# Patient Record
Sex: FEMALE | Race: WHITE | NOT HISPANIC OR LATINO | Employment: STUDENT | URBAN - METROPOLITAN AREA
[De-identification: names, ages, dates, MRNs, and addresses within clinical notes are randomized per-mention and may not be internally consistent; named-entity substitution may affect disease eponyms.]

---
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
## 2019-01-09 ENCOUNTER — OFFICE VISIT (OUTPATIENT)
Dept: OBGYN CLINIC | Age: 19
End: 2019-01-09
Payer: COMMERCIAL

## 2019-01-09 VITALS
RESPIRATION RATE: 18 BRPM | DIASTOLIC BLOOD PRESSURE: 70 MMHG | SYSTOLIC BLOOD PRESSURE: 102 MMHG | HEART RATE: 90 BPM | WEIGHT: 127.13 LBS | BODY MASS INDEX: 19.33 KG/M2

## 2019-01-09 DIAGNOSIS — N39.0 RECURRENT UTI: Primary | ICD-10-CM

## 2019-01-09 PROCEDURE — 99212 OFFICE O/P EST SF 10 MIN: CPT | Performed by: OBSTETRICS & GYNECOLOGY

## 2019-01-09 NOTE — ASSESSMENT & PLAN NOTE
Here to review/verify instructions for antibiotic use for prevention of UTI  At this time current recommendation is macrobid 50mg with sexual activity    Reassured that this is a safe option    Reviewed pre and post IC void, general vulvar hygiene, good bladder practices, overall good health- hydration, healthy diet    Safe and effective use of medication discussed

## 2019-01-09 NOTE — PROGRESS NOTES
Assessment/Plan:    Recurrent UTI  Here to review/verify instructions for antibiotic use for prevention of UTI  At this time current recommendation is macrobid 50mg with sexual activity    Reassured that this is a safe option    Reviewed pre and post IC void, general vulvar hygiene, good bladder practices, overall good health- hydration, healthy diet    Safe and effective use of medication discussed  Diagnoses and all orders for this visit:    Recurrent UTI          Subjective:      Patient ID: Miladys Vicente is a 22 y o  female  Patient here for consult for recurring uti's  Patients mother states she saw pcp in December was told to take a antibiotic after intercourse for 6 months then to see her if symptoms dont improve then to see urologist  Mother had some questions in regards to that because she doesn't want her daughter to take that  Would like to know if there are any other options  The following portions of the patient's history were reviewed and updated as appropriate:   She  has a past medical history of Urinary tract infection  She   Patient Active Problem List    Diagnosis Date Noted    Sensation of lump in throat 12/14/2018    Pharyngitis 12/04/2018    Cough 12/04/2018    Disturbance, sleep 12/04/2018    Recurrent UTI 04/04/2018    Vaginal discomfort 04/04/2018    Dysuria-frequency syndrome 03/13/2018    Acne 11/07/2016     She  has a past surgical history that includes No past surgeries  Her family history includes Arthritis in her mother; Hypertension in her father  She  reports that she has never smoked  She has never used smokeless tobacco  She reports that she does not drink alcohol or use drugs    Current Outpatient Prescriptions   Medication Sig Dispense Refill    nitrofurantoin (MACRODANTIN) 50 mg capsule Take one capsule after intercourse 30 capsule 3    norethindrone-ethinyl estradiol (JUNEL FE 1/20) 1-20 MG-MCG per tablet Take 1 tablet by mouth daily for 92 days 92 tablet 0    benzonatate (TESSALON) 200 MG capsule       predniSONE 20 mg tablet        No current facility-administered medications for this visit  Current Outpatient Prescriptions on File Prior to Visit   Medication Sig    nitrofurantoin (MACRODANTIN) 50 mg capsule Take one capsule after intercourse    norethindrone-ethinyl estradiol (JUNEL FE 1/20) 1-20 MG-MCG per tablet Take 1 tablet by mouth daily for 92 days    benzonatate (TESSALON) 200 MG capsule     predniSONE 20 mg tablet      No current facility-administered medications on file prior to visit  She has No Known Allergies       Review of Systems      Objective:      /70 (BP Location: Right arm, Patient Position: Sitting, Cuff Size: Standard)   Pulse 90   Resp 18   Wt 57 7 kg (127 lb 2 oz)   BMI 19 33 kg/m²          Physical Exam

## 2019-01-09 NOTE — PATIENT INSTRUCTIONS

## 2019-01-30 ENCOUNTER — TELEPHONE (OUTPATIENT)
Dept: OBGYN CLINIC | Age: 19
End: 2019-01-30

## 2019-01-30 DIAGNOSIS — Z78.9 USES ORAL CONTRACEPTIVES AS PRIMARY BIRTH CONTROL METHOD: ICD-10-CM

## 2019-01-30 RX ORDER — NORETHINDRONE ACETATE AND ETHINYL ESTRADIOL 1MG-20(21)
1 KIT ORAL DAILY
Qty: 92 TABLET | Refills: 3 | Status: SHIPPED | OUTPATIENT
Start: 2019-01-30 | End: 2020-01-15 | Stop reason: SDUPTHER

## 2019-01-30 NOTE — TELEPHONE ENCOUNTER
Spoke with GRECIA and she is ok refilling her [facility] as her patient now  Please refill Junelfe 24 to [facility] please

## 2019-05-02 ENCOUNTER — OFFICE VISIT (OUTPATIENT)
Dept: FAMILY MEDICINE CLINIC | Facility: CLINIC | Age: 19
End: 2019-05-02
Payer: COMMERCIAL

## 2019-05-02 VITALS
SYSTOLIC BLOOD PRESSURE: 110 MMHG | DIASTOLIC BLOOD PRESSURE: 64 MMHG | HEART RATE: 57 BPM | BODY MASS INDEX: 19.25 KG/M2 | WEIGHT: 127 LBS | TEMPERATURE: 98.8 F | HEIGHT: 68 IN | OXYGEN SATURATION: 98 %

## 2019-05-02 DIAGNOSIS — J35.8 TONSIL STONE: Primary | ICD-10-CM

## 2019-05-02 PROCEDURE — 99213 OFFICE O/P EST LOW 20 MIN: CPT | Performed by: PHYSICIAN ASSISTANT

## 2019-06-26 ENCOUNTER — TELEPHONE (OUTPATIENT)
Dept: OBGYN CLINIC | Age: 19
End: 2019-06-26

## 2019-06-26 ENCOUNTER — APPOINTMENT (OUTPATIENT)
Dept: LAB | Facility: HOSPITAL | Age: 19
End: 2019-06-26
Attending: OBSTETRICS & GYNECOLOGY
Payer: COMMERCIAL

## 2019-06-26 DIAGNOSIS — N39.0 CHRONIC UTI: Primary | ICD-10-CM

## 2019-06-26 DIAGNOSIS — N39.0 CHRONIC UTI: ICD-10-CM

## 2019-06-26 LAB
BACTERIA UR QL AUTO: ABNORMAL /HPF
BILIRUB UR QL STRIP: ABNORMAL
CLARITY UR: ABNORMAL
COLOR UR: ABNORMAL
GLUCOSE UR STRIP-MCNC: ABNORMAL MG/DL
HGB UR QL STRIP.AUTO: NEGATIVE
KETONES UR STRIP-MCNC: ABNORMAL MG/DL
LEUKOCYTE ESTERASE UR QL STRIP: ABNORMAL
NITRITE UR QL STRIP: POSITIVE
NON-SQ EPI CELLS URNS QL MICRO: ABNORMAL /HPF
PH UR STRIP.AUTO: 6.5 [PH]
PROT UR STRIP-MCNC: >=300 MG/DL
RBC #/AREA URNS AUTO: ABNORMAL /HPF
SP GR UR STRIP.AUTO: 1.02 (ref 1–1.03)
UROBILINOGEN UR QL STRIP.AUTO: >=8 E.U./DL
WBC #/AREA URNS AUTO: ABNORMAL /HPF
WBC CLUMPS # UR AUTO: PRESENT /UL

## 2019-06-26 PROCEDURE — 81001 URINALYSIS AUTO W/SCOPE: CPT

## 2019-06-26 PROCEDURE — 87086 URINE CULTURE/COLONY COUNT: CPT

## 2019-06-26 PROCEDURE — 87186 SC STD MICRODIL/AGAR DIL: CPT

## 2019-06-26 PROCEDURE — 87077 CULTURE AEROBIC IDENTIFY: CPT

## 2019-06-26 RX ORDER — SULFAMETHOXAZOLE AND TRIMETHOPRIM 800; 160 MG/1; MG/1
TABLET ORAL
Qty: 14 TABLET | Refills: 0 | Status: CANCELLED | OUTPATIENT
Start: 2019-06-26 | End: 2019-06-29

## 2019-06-27 ENCOUNTER — TELEPHONE (OUTPATIENT)
Dept: OBGYN CLINIC | Facility: CLINIC | Age: 19
End: 2019-06-27

## 2019-06-28 ENCOUNTER — TELEPHONE (OUTPATIENT)
Dept: OBGYN CLINIC | Facility: CLINIC | Age: 19
End: 2019-06-28

## 2019-06-28 LAB — BACTERIA UR CULT: ABNORMAL

## 2019-06-28 NOTE — LETTER
07/01/19    Khanh Ruffin        We have been attempting to contact you regarding an important health issue  We have been unsuccessful in our attempts to reach you by phone  Please call our office as soon as possible at [phone] to discuss this important issue with on of our nurses  Your healthcare and well-being are our greatest concerns  Thank you for your cooperation in this matter        Sincerely,    [facility]

## 2019-06-28 NOTE — TELEPHONE ENCOUNTER
----- Message from Ollie Nguyen MD sent at 6/28/2019 12:57 PM EDT -----  Please callJulianna - her UTI is susceptible to both macrobid and bactrim  Is she feeling better?

## 2019-09-23 ENCOUNTER — TELEPHONE (OUTPATIENT)
Dept: OBGYN CLINIC | Facility: CLINIC | Age: 19
End: 2019-09-23

## 2019-09-23 DIAGNOSIS — N30.00 ACUTE CYSTITIS WITHOUT HEMATURIA: Primary | ICD-10-CM

## 2019-09-23 NOTE — TELEPHONE ENCOUNTER
Spoke with Mom    She will  Bactrim ds, # 6, bid x 3 called to [facility] on file     Pt will give ua first and then take rx

## 2019-09-23 NOTE — TELEPHONE ENCOUNTER
I reached pt for few secs - got cut off and vmailbox "not set up"  lmtcb at other  #    U/A and culture entered

## 2019-09-23 NOTE — TELEPHONE ENCOUNTER
Pt's mother is calling back - she is hoping Arlin can get a prescription for tonight  Pt can be reached at [phone]  To triage symptoms  [facility] is on file

## 2019-11-14 ENCOUNTER — OFFICE VISIT (OUTPATIENT)
Dept: OBGYN CLINIC | Facility: CLINIC | Age: 19
End: 2019-11-14
Payer: COMMERCIAL

## 2019-11-14 VITALS — DIASTOLIC BLOOD PRESSURE: 58 MMHG | SYSTOLIC BLOOD PRESSURE: 94 MMHG

## 2019-11-14 DIAGNOSIS — Z87.440 HISTORY OF FREQUENT URINARY TRACT INFECTIONS: ICD-10-CM

## 2019-11-14 DIAGNOSIS — R39.9 UTI SYMPTOMS: Primary | ICD-10-CM

## 2019-11-14 PROCEDURE — 87186 SC STD MICRODIL/AGAR DIL: CPT

## 2019-11-14 PROCEDURE — 87086 URINE CULTURE/COLONY COUNT: CPT

## 2019-11-14 PROCEDURE — 87077 CULTURE AEROBIC IDENTIFY: CPT

## 2019-11-14 PROCEDURE — 99213 OFFICE O/P EST LOW 20 MIN: CPT | Performed by: NURSE PRACTITIONER

## 2019-11-14 RX ORDER — NITROFURANTOIN 25; 75 MG/1; MG/1
100 CAPSULE ORAL 2 TIMES DAILY
Qty: 14 CAPSULE | Refills: 0 | Status: SHIPPED | OUTPATIENT
Start: 2019-11-14 | End: 2019-11-21

## 2019-11-14 RX ORDER — SULFAMETHOXAZOLE AND TRIMETHOPRIM 800; 160 MG/1; MG/1
TABLET ORAL
COMMUNITY
Start: 2019-09-23 | End: 2019-11-14 | Stop reason: ALTCHOICE

## 2019-11-14 RX ORDER — NORETHINDRONE ACETATE AND ETHINYL ESTRADIOL AND FERROUS FUMARATE 1MG-20(24)
KIT ORAL
COMMUNITY
Start: 2017-12-04 | End: 2019-11-14 | Stop reason: SDUPTHER

## 2019-11-14 NOTE — PATIENT INSTRUCTIONS
Urinary Tract Infection in Women   WHAT YOU NEED TO KNOW:   What is a urinary tract infection (UTI)? A UTI is caused by bacteria that get inside your urinary tract  Your urinary tract includes your kidneys, ureters, bladder, and urethra  Urine is made in your kidneys, and it flows from the ureters to the bladder  Urine leaves the bladder through the urethra  A UTI is more common in your lower urinary tract, which includes your bladder and urethra  What increases my risk for a UTI? · A urinary catheter or self-catheterization    · Pregnancy    · Urinary tract problems, such as a narrowing, kidney stones, or inability to empty your bladder completely    · History of a UTI    · Sexual intercourse    · Menopause    · Diabetes or obesity  What are the signs and symptoms of a UTI? · Urinating more often than usual, leaking urine, or waking from sleep to urinate    · Pain or burning when you urinate    · Pain or pressure in your lower abdomen     · Urine that smells bad    · Blood in your urine  How is a UTI diagnosed? Your healthcare provider will ask about your signs and symptoms  Your provider may press on your abdomen, sides, and back to check if you feel pain  Your urine will be tested for bacteria that may be causing your infection  If you have UTIs often, you may need more tests to find the cause  How is a UTI treated? · Antibiotics  help fight a bacterial infection  · Medicines  may be given to decrease pain and burning when you urinate  They will also help decrease the feeling that you need to urinate often  These medicines will make your urine orange or red  What can I do to prevent a UTI? · Empty your bladder often  Urinate and empty your bladder as soon as you feel the need  Do not hold your urine for long periods of time  · Wipe from front to back after you urinate or have a bowel movement    This will help prevent germs from getting into your urinary tract through your urethra  · Drink liquids as directed  Ask how much liquid to drink each day and which liquids are best for you  You may need to drink more liquids than usual to help flush out the bacteria  Do not drink alcohol, caffeine, or citrus juices  These can irritate your bladder and increase your symptoms  Your healthcare provider may recommend cranberry juice to help prevent a UTI  · Urinate after you have sex  This can help flush out bacteria passed during sex  · Do not douche or use feminine deodorants  These can change the chemical balance in your vagina  · Change sanitary pads or tampons often  This will help prevent germs from getting into your urinary tract  · Do pelvic muscle exercises often  Pelvic muscle exercises may help you start and stop urinating  Strong pelvic muscles may help you empty your bladder easier  Squeeze these muscles tightly for 5 seconds like you are trying to hold back urine  Then relax for 5 seconds  Gradually work up to squeezing for 10 seconds  Do 3 sets of 15 repetitions a day, or as directed  When should I seek immediate care? · You are urinating very little or not at all  · You have a high fever with shaking chills  · You have side or back pain that gets worse  When should I contact my healthcare provider? · You have a mild fever  · You do not feel better after 2 days of taking antibiotics  · You have new symptoms, such as blood or pus in your urine  · You are vomiting  · You have questions or concerns about your condition or care  CARE AGREEMENT:   You have the right to help plan your care  Learn about your health condition and how it may be treated  Discuss treatment options with your caregivers to decide what care you want to receive  You always have the right to refuse treatment  The above information is an  only  It is not intended as medical advice for individual conditions or treatments   Talk to your doctor, nurse or pharmacist before following any medical regimen to see if it is safe and effective for you  © 2017 [employer] Information is for End User's use only and may not be sold, redistributed or otherwise used for commercial purposes  All illustrations and images included in CareNotes® are the copyrighted property of [employer]  or Shailesh Portillo

## 2019-11-14 NOTE — PROGRESS NOTES
Diagnoses and all orders for this visit:    1  UTI symptoms  -     nitrofurantoin (MACROBID) 100 mg capsule; Take 1 capsule (100 mg total) by mouth 2 (two) times a day for 7 days  Will start empirical treatment now and will treat further based on urine culture  Can continue with AZO as well since it helps her symptoms  2  History of frequent urinary tract infections  -     Ambulatory referral to Urogynecology; Future    Other orders  -     Discontinue: sulfamethoxazole-trimethoprim (BACTRIM DS) 800-160 mg per tablet  -     Discontinue: norethindrone-ethinyl estradiol-ferrous fumarate (JUNEL FE 24) 1-20 MG-MCG(24) per tablet      All questions and concerns answered  Call with any questions  Needs annual visit for year supply of birth control  Pleasant 20 y o  female presents today with c/o UTI symptoms  She is present with her mother today  She started to have frequency, burning and dysuria that started 2 days ago  She denies flank pain, hematuria, F/C, she also started taking OTC AZO for UTI tx, so she is having red discoloration to her urine today  She has a history with frequent UTI's after intercourse and was on a maintenance dosing of macrobid, 1 tab, she was told to stop this treatment for a period of time and she did and now this is her first episode of symptoms  She has never seen a urologist for this problem in th past   Her mother states that they would like to know why this happens and should they be concerned because she is so young  Vitals:    11/14/19 1534   BP: 94/58   BP Location: Right arm   Patient Position: Sitting   Cuff Size: Standard     There is no height or weight on file to calculate BMI      No Known Allergies    Past Medical History:   Diagnosis Date    Urinary tract infection      Past Surgical History:   Procedure Laterality Date    NO PAST SURGERIES       Family History   Problem Relation Age of Onset    Hypertension Father         benign essential    Arthritis Mother     Breast cancer Neg Hx      Social History     Tobacco Use    Smoking status: Never Smoker    Smokeless tobacco: Never Used   Substance Use Topics    Alcohol use: No    Drug use: No       Current Outpatient Medications:     norethindrone-ethinyl estradiol (JUNEL FE 1/20) 1-20 MG-MCG per tablet, Take 1 tablet by mouth daily for 92 days, Disp: 92 tablet, Rfl: 3    benzonatate (TESSALON) 200 MG capsule, , Disp: , Rfl:     nitrofurantoin (MACROBID) 100 mg capsule, Take 1 capsule (100 mg total) by mouth 2 (two) times a day for 7 days, Disp: 14 capsule, Rfl: 0    nitrofurantoin (MACRODANTIN) 50 mg capsule, Take one capsule after intercourse (Patient not taking: Reported on 2019), Disp: 30 capsule, Rfl: 3    predniSONE 20 mg tablet, , Disp: , Rfl:     OB History    Para Term  AB Living   0 0 0 0 0 0   SAB TAB Ectopic Multiple Live Births   0 0 0 0 0       Review of Systems     Review of Systems   Constitutional: Negative for chills, fatigue, fever and unexpected weight change  Respiratory: Negative for shortness of breath  Gastrointestinal: Negative for anal bleeding, blood in stool, constipation and diarrhea  Genitourinary: Negative for difficulty urinating, dysuria and hematuria  OBGyn Exam     Physical Exam   Constitutional: She appears well-developed and well-nourished  No distress  Head: Normocephalic  Neck: Normal range of motion  Neck supple  Pulmonary: Effort normal  Lung sounds clear  Cardiac:  S1& S2, regular rate and rhythm  Abdominal: Soft   Non-tender, denies CVA tenderness  Pelvic exam was deferred

## 2019-11-15 DIAGNOSIS — N39.0 RECURRENT UTI: Primary | ICD-10-CM

## 2019-11-17 LAB — BACTERIA UR CULT: ABNORMAL

## 2019-11-18 ENCOUNTER — TELEPHONE (OUTPATIENT)
Dept: OBGYN CLINIC | Facility: CLINIC | Age: 19
End: 2019-11-18

## 2019-11-18 NOTE — TELEPHONE ENCOUNTER
Pt contacted # [phone]-TARUN vm- per [facility] communication consent on file- lmom advising as directed

## 2019-11-18 NOTE — TELEPHONE ENCOUNTER
----- Message from Phong Mooney sent at 11/18/2019  7:07 AM EST -----  Please let pt know that her urine culture was positive for UTI  Current ABX is ok to complete  E-coli bacteria, recommend wiping front to back  Also continue with plan to F/U with URO/GYN to see why she has this history  She can restart single ABX dose after intercourse until she sees those specialists for further management  Call if her symptoms do not improve  Thanks!

## 2020-01-15 ENCOUNTER — TELEPHONE (OUTPATIENT)
Dept: OBGYN CLINIC | Facility: CLINIC | Age: 20
End: 2020-01-15

## 2020-01-15 DIAGNOSIS — Z78.9 USES ORAL CONTRACEPTIVES AS PRIMARY BIRTH CONTROL METHOD: ICD-10-CM

## 2020-01-15 RX ORDER — NORETHINDRONE ACETATE AND ETHINYL ESTRADIOL 1MG-20(21)
1 KIT ORAL DAILY
Qty: 90 TABLET | Refills: 1 | Status: SHIPPED | OUTPATIENT
Start: 2020-01-15 | End: 2020-06-25

## 2020-01-15 NOTE — TELEPHONE ENCOUNTER
Patient needs refill to [id number]  They were also inquiring on automatice refills  She gets 90 day supply of Junel  I have scheduled her annual visit with Dr Claribel Byrd for 4/14/2020 at 2pm     She is on her last week now

## 2020-01-16 NOTE — TELEPHONE ENCOUNTER
They will not be able to get the mailorder one for 5-7 days  Can we call one pack into her local pharmacy for pickup?     [facility] in [place]    Please Advise

## 2020-02-17 ENCOUNTER — SOCIAL WORK (OUTPATIENT)
Dept: BEHAVIORAL/MENTAL HEALTH CLINIC | Facility: CLINIC | Age: 20
End: 2020-02-17
Payer: COMMERCIAL

## 2020-02-17 DIAGNOSIS — F43.23 ADJUSTMENT DISORDER WITH MIXED ANXIETY AND DEPRESSED MOOD: Primary | ICD-10-CM

## 2020-02-17 PROCEDURE — 90834 PSYTX W PT 45 MINUTES: CPT | Performed by: SOCIAL WORKER

## 2020-02-17 PROCEDURE — 1036F TOBACCO NON-USER: CPT | Performed by: SOCIAL WORKER

## 2020-02-17 NOTE — PSYCH
Start Time 3:00PM-3:50PM  Assessment/Plan: Insecurities and low self esteem  There are no diagnoses linked to this encounter  Subjective:  Mahamed Concepcion was able to share that she does not like the way that she looks  Her boyfriend recently broke up with her and she is still in love with him  Her insecurities played a big role in why they broke up  She has a good relationship with her parents and her older brother  She is not able to like the way she looks  She does not like the way her nose is and she used to have acne when she was younger  She became teary eyed when she spoke of how she looks  She thought about getting a "a nose job" but is afraid that she will not like a new nose either  She struggles with the college party scene  She worried about her boyfriend going to a college party and finding someone better looking that her  Discussion of what would have happened if he would have gotten drunk and cheated on her and she said that they would have broken up which they did anyway  Patient ID: Wan Ziegler is a 20 y o  female  HPI    Review of Joe Aburto is not currently on any medications  Objective:  Mahamed Concepcion lacks the self confidence as she does not like the way her nose looks and this was the focus of the session  She is alberto to recognize that in others looks are not everything but who you are, but self admittedly struggles applying this to herself  She is friendly and cooperative and made good eye contact  She became teary eyed when speaking of her ex  She will make a pros and cons list of a  Nose job and what her good qualities are for next session       Physical Exam  Mental Health: Denies any SI/HI or AH/VH  Mood:Dysthymic  Affect:Congruent  Appearance: Well groomed and appropriate   Speech:Coherent   Thought Process: Logical

## 2020-03-02 ENCOUNTER — SOCIAL WORK (OUTPATIENT)
Dept: BEHAVIORAL/MENTAL HEALTH CLINIC | Facility: CLINIC | Age: 20
End: 2020-03-02
Payer: COMMERCIAL

## 2020-03-02 DIAGNOSIS — F43.23 ADJUSTMENT DISORDER WITH MIXED ANXIETY AND DEPRESSED MOOD: Primary | ICD-10-CM

## 2020-03-02 PROCEDURE — 1036F TOBACCO NON-USER: CPT | Performed by: SOCIAL WORKER

## 2020-03-02 PROCEDURE — 90834 PSYTX W PT 45 MINUTES: CPT | Performed by: SOCIAL WORKER

## 2020-03-02 NOTE — PSYCH
Start Time 2:00PM-2:45PM  Assessment/Plan: Robbie Templeton is not happy with the way she looks  There are no diagnoses linked to this encounter  Subjective:  Robbie Templeton was able to share that she is getting out more as when her boyfriend broke up with her 6 weeks ago and she has found out things about him that make her grateful that they are no longer together  She stated that she has no idea what she will do in the fall  She is speaking of going to [employer] [place] as she is a communications major but she also enjoys the arts  She stated that her ex boyfriend is losing more than she is as she is motivated and he is not  She does not feel as though he has much of a future  She states that all these things are making her understand that they would have never worked anyway  She does not want to be with someone who she has to be supportive and try to motivate  She did the activity of making a pros and cons list of her getting a nose job  She stated that the cons are far more than the pros  This session was more honest and about the break up that she went through as opposed to her appearance  She stated and presented as being far more confident  Patient ID: Buddy Jose is a 20 y o  female  HPI    Review of Systems  Robbie Templeton was able to state that she has been following all treatment recommendations  Objective:  Robbie Templeton was able to be honest as evidenced by the subject matter of the session and the focus being off of her nose  She is insightful to the fact that she does not want to be with him, although spent the whole session talking about him and his lack of communication  She was initially blaming herself about her insecurities negatively affecting the relationship although she is no longer blaming herself due to his behaviors  Area of concern is her own insecurities and her ability to put pressure on him and now absolving herself of any responsibility for the relationship    She is presenting with a false sense of confidence which may be her defense mechanism       Physical Exam  Mental Health denies any SI/HI or AH/VH  Mood: Euthymic  Affect: Broad  Appearance: Appropriate, presents as stated age, well groomed  Speech: coherent  Thought Process: Logical

## 2020-03-02 NOTE — PATIENT INSTRUCTIONS
Follow up in 2 weeks Patient: Vandana Aranda    Procedure Summary     Date:  02/27/19 Room / Location:  [facility] ENDOSCOPY 01 / [facility] ENDO    Anesthesia Start:  0476 Anesthesia Stop:      Procedure:  COLONOSCOPY (N/A ) Diagnosis:       Colon cancer screening      (Barbie Sepulveda

## 2020-03-11 ENCOUNTER — SOCIAL WORK (OUTPATIENT)
Dept: BEHAVIORAL/MENTAL HEALTH CLINIC | Facility: CLINIC | Age: 20
End: 2020-03-11
Payer: COMMERCIAL

## 2020-03-11 DIAGNOSIS — F43.23 ADJUSTMENT DISORDER WITH MIXED ANXIETY AND DEPRESSED MOOD: Primary | ICD-10-CM

## 2020-03-11 PROCEDURE — 90834 PSYTX W PT 45 MINUTES: CPT | Performed by: SOCIAL WORKER

## 2020-03-11 PROCEDURE — 1036F TOBACCO NON-USER: CPT | Performed by: SOCIAL WORKER

## 2020-03-11 NOTE — PSYCH
Start Time 9:05Am-9:50 AM  Assessment/Plan:   Arlin reports that she had insomnia last night and was not able to sleep  There are no diagnoses linked to this encounter  Subjective:  Dodie Walton stated that this is spring break and she is not seeing her ex which is helpful  She spent the weekend with her cousin in [place] where her cousin goes to school  She got accepted to [employer] and will be going there in the fall  She is not concerned about the one night of insomnia  She states that her her new goals would have never been met if she was still with him  She states her family liked him but knew that she would not end being with him forever  She feels that with her communication degree she will have more opportunity in the [place] area  She is nervous about living away from home for the first time although she is looking forward to it  She states that she is excited but knows she will miss home  She is looking for off campus housing and she does not know if she will take her car  She further states that her mother will miss her as she is the last one in the home  She states that her mother still cries when her brother leaves home  She is going to see how she will handle everything before looking into a part time job  Her parents are paying for school and want her to concentrate on school  She is in acceptance about her looks since they broke up and she feels less worried about how she looks  She states that she was jealous and insecure and that is what turned him off to her and it had nothing to do with her looks  She states that she likes who she is and she feels confident  She is understanding that she is also responsible oft the relationship not working  Patient ID: Scott Hackett is a 20 y o  female  HPI    Review of Ivone Friend reports taking her medications as prescribed        Objective: Arlin is insightful to her role in the breakup although feels as though her insecurities about her looks are out of her control which is an area of concern  Her stating that she is confident appears to be a defense mechanism as she is extremely insecure  She did not want hm to hang out with his friends and this was a problem as well  She has many strengths including her intelligence but her inability to not over think things is also an area of concern  She has made progress in her ability to assume her role in her relationship demise  Physical Exam  Mental Health:  Denies any SI/HI or AH/VH  Mood:  Euthymic  Affect: Congruent  Appearance: Casual, dressed for the season and presents as stated age    Speech: Coherent  Thought Process: Logical

## 2020-03-23 ENCOUNTER — TELEPHONE (OUTPATIENT)
Dept: BEHAVIORAL/MENTAL HEALTH CLINIC | Facility: CLINIC | Age: 20
End: 2020-03-23

## 2020-06-25 DIAGNOSIS — Z78.9 USES ORAL CONTRACEPTIVES AS PRIMARY BIRTH CONTROL METHOD: ICD-10-CM

## 2020-06-25 RX ORDER — NORETHINDRONE ACETATE AND ETHINYL ESTRADIOL 1MG-20(21)
KIT ORAL
Qty: 84 TABLET | Refills: 3 | Status: SHIPPED | OUTPATIENT
Start: 2020-06-25 | End: 2020-08-12 | Stop reason: SDUPTHER

## 2020-08-12 ENCOUNTER — OFFICE VISIT (OUTPATIENT)
Dept: FAMILY MEDICINE CLINIC | Facility: CLINIC | Age: 20
End: 2020-08-12
Payer: COMMERCIAL

## 2020-08-12 ENCOUNTER — ANNUAL EXAM (OUTPATIENT)
Dept: OBGYN CLINIC | Facility: CLINIC | Age: 20
End: 2020-08-12
Payer: COMMERCIAL

## 2020-08-12 VITALS — SYSTOLIC BLOOD PRESSURE: 96 MMHG | WEIGHT: 135.6 LBS | BODY MASS INDEX: 20.62 KG/M2 | DIASTOLIC BLOOD PRESSURE: 60 MMHG

## 2020-08-12 VITALS
TEMPERATURE: 98.9 F | SYSTOLIC BLOOD PRESSURE: 120 MMHG | HEIGHT: 68 IN | DIASTOLIC BLOOD PRESSURE: 70 MMHG | HEART RATE: 99 BPM | BODY MASS INDEX: 20.46 KG/M2 | OXYGEN SATURATION: 100 % | WEIGHT: 135 LBS | RESPIRATION RATE: 14 BRPM

## 2020-08-12 DIAGNOSIS — Z78.9 USES ORAL CONTRACEPTIVES AS PRIMARY BIRTH CONTROL METHOD: ICD-10-CM

## 2020-08-12 DIAGNOSIS — N39.0 RECURRENT UTI: Primary | ICD-10-CM

## 2020-08-12 DIAGNOSIS — J02.9 PHARYNGITIS, UNSPECIFIED ETIOLOGY: Primary | ICD-10-CM

## 2020-08-12 PROCEDURE — 99395 PREV VISIT EST AGE 18-39: CPT | Performed by: STUDENT IN AN ORGANIZED HEALTH CARE EDUCATION/TRAINING PROGRAM

## 2020-08-12 PROCEDURE — 99213 OFFICE O/P EST LOW 20 MIN: CPT | Performed by: FAMILY MEDICINE

## 2020-08-12 PROCEDURE — 3008F BODY MASS INDEX DOCD: CPT | Performed by: FAMILY MEDICINE

## 2020-08-12 PROCEDURE — 1036F TOBACCO NON-USER: CPT | Performed by: FAMILY MEDICINE

## 2020-08-12 RX ORDER — NORETHINDRONE ACETATE AND ETHINYL ESTRADIOL 1MG-20(21)
1 KIT ORAL DAILY
Qty: 84 TABLET | Refills: 3 | Status: SHIPPED | OUTPATIENT
Start: 2020-08-12 | End: 2021-03-24 | Stop reason: SDUPTHER

## 2020-08-12 RX ORDER — SULFAMETHOXAZOLE AND TRIMETHOPRIM 400; 80 MG/1; MG/1
TABLET ORAL
Qty: 30 TABLET | Refills: 1 | Status: SHIPPED | OUTPATIENT
Start: 2020-08-12 | End: 2021-08-10 | Stop reason: ALTCHOICE

## 2020-08-12 NOTE — PROGRESS NOTES
Assessment/Plan:     Chronic Problems:  No problem-specific Assessment & Plan notes found for this encounter  Visit Diagnosis:  Diagnoses and all orders for this visit:    Pharyngitis, unspecified etiology     Rapid strep negative, discussed plan of care with patient's mother, recommended OTC Tylenol/motrin for discomfort, throat loss, gargle  Call for any changes      Subjective:    Patient ID: Enrique Miranda is a 20 y o  female  Here with mother  Complaining of sore throat x1 day  Negative ill contact, has recently tested negative for COVID  Negative fever chills cough rash earache sinus  Self treating with Motrin  Nonsmoker  Employed        The following portions of the patient's history were reviewed and updated as appropriate: allergies, current medications, past family history, past medical history, past social history, past surgical history and problem list     Review of Systems   Constitutional: Negative for appetite change, chills, fever and unexpected weight change  HENT: Positive for sore throat  Negative for congestion, dental problem, ear pain, hearing loss, postnasal drip, rhinorrhea, sinus pressure, sinus pain, sneezing, tinnitus and voice change  Eyes: Negative for visual disturbance  Respiratory: Negative for apnea, cough, chest tightness and shortness of breath  Cardiovascular: Negative for chest pain, palpitations and leg swelling  Gastrointestinal: Negative for abdominal pain, blood in stool, constipation, diarrhea, nausea and vomiting  Endocrine: Negative for cold intolerance, heat intolerance, polydipsia, polyphagia and polyuria  Genitourinary: Negative for decreased urine volume, difficulty urinating, dysuria, frequency and hematuria  Musculoskeletal: Negative for arthralgias, back pain, gait problem, joint swelling and myalgias  Skin: Negative for color change, rash and wound  Allergic/Immunologic: Negative for environmental allergies and food allergies  Neurological: Negative for dizziness, syncope, weakness, light-headedness, numbness and headaches  Hematological: Negative for adenopathy  Does not bruise/bleed easily  Psychiatric/Behavioral: Negative for sleep disturbance and suicidal ideas  The patient is not nervous/anxious            /70 (BP Location: Left arm, Patient Position: Sitting, Cuff Size: Adult)   Pulse 99   Temp 98 9 °F (37 2 °C)   Resp 14   Ht 5' 8" (1 727 m)   Wt 61 2 kg (135 lb)   LMP 07/27/2020 (Exact Date)   SpO2 100%   BMI 20 53 kg/m²   Social History     Socioeconomic History    Marital status: Single     Spouse name: Not on file    Number of children: Not on file    Years of education: currently in school    Highest education level: Not on file   Occupational History    Not on file   Social Needs    Financial resource strain: Not on file    Food insecurity     Worry: Not on file     Inability: Not on file    Transportation needs     Medical: Not on file     Non-medical: Not on file   Tobacco Use    Smoking status: Never Smoker    Smokeless tobacco: Never Used   Substance and Sexual Activity    Alcohol use: No    Drug use: No    Sexual activity: Yes     Birth control/protection: OCP   Lifestyle    Physical activity     Days per week: Not on file     Minutes per session: Not on file    Stress: Not on file   Relationships    Social connections     Talks on phone: Not on file     Gets together: Not on file     Attends [employer] service: Not on file     Active member of club or organization: Not on file     Attends meetings of clubs or organizations: Not on file     Relationship status: Not on file    Intimate partner violence     Fear of current or ex partner: Not on file     Emotionally abused: Not on file     Physically abused: Not on file     Forced sexual activity: Not on file   Other Topics Concern    Not on file   Social History Narrative    Lives with parents    Seeing a dentist     Past Medical History:   Diagnosis Date    Urinary tract infection      Family History   Problem Relation Age of Onset    Hypertension Father         benign essential    Arthritis Mother     Breast cancer Neg Hx      Past Surgical History:   Procedure Laterality Date    NO PAST SURGERIES         Current Outpatient Medications:     norethindrone-ethinyl estradiol (Blisovi FE 1/20) 1-20 MG-MCG per tablet, Take 1 tablet by mouth daily, Disp: 84 tablet, Rfl: 3    predniSONE 20 mg tablet, , Disp: , Rfl:     sulfamethoxazole-trimethoprim (BACTRIM) 400-80 mg per tablet, Take 1 pill following each episode of intercourse , Disp: 30 tablet, Rfl: 1    benzonatate (TESSALON) 200 MG capsule, , Disp: , Rfl:     nitrofurantoin (MACRODANTIN) 50 mg capsule, Take one capsule after intercourse (Patient not taking: Reported on 11/14/2019), Disp: 30 capsule, Rfl: 3    No Known Allergies       Lab Review   No visits with results within 2 Month(s) from this visit  Latest known visit with results is:   Orders Only on 11/15/2019   Component Date Value    Urine Culture 11/14/2019 >100,000 cfu/ml Escherichia coli*        Imaging: No results found  Objective:     Physical Exam  Constitutional:       General: She is not in acute distress  Appearance: She is well-developed  She is not ill-appearing or toxic-appearing  HENT:      Head: Normocephalic and atraumatic  Right Ear: Tympanic membrane and ear canal normal       Left Ear: Tympanic membrane and ear canal normal       Mouth/Throat:      Pharynx: Posterior oropharyngeal erythema (Mild) present  Eyes:      Conjunctiva/sclera: Conjunctivae normal    Neck:      Musculoskeletal: Normal range of motion and neck supple  Cardiovascular:      Rate and Rhythm: Normal rate and regular rhythm  Heart sounds: Normal heart sounds  Pulmonary:      Effort: Pulmonary effort is normal       Breath sounds: Normal breath sounds  Musculoskeletal: Normal range of motion  Lymphadenopathy:      Cervical: Cervical adenopathy ( left anterior cervical tender) present  Skin:     General: Skin is warm and dry  Findings: No rash  Neurological:      Mental Status: She is alert and oriented to person, place, and time  Deep Tendon Reflexes: Reflexes are normal and symmetric  Psychiatric:         Behavior: Behavior normal          Thought Content: Thought content normal          Judgment: Judgment normal            There are no Patient Instructions on file for this visit  MOSHE Pedraza    Portions of the record may have been created with voice recognition software  Occasional wrong word or "sound a like" substitutions may have occurred due to the inherent limitations of voice recognition software  Read the chart carefully and recognize, using context, where substitutions have occurred

## 2020-08-12 NOTE — PROGRESS NOTES
Assessment/Plan:     19 yo G0 with recurrent UTIs - will Rx post-coital bactrim  Discussed hydrating, hygiene, etc   Will refill Junel  F/u in 1 yr for pap smear  Problem List Items Addressed This Visit     Genitourinary    Recurrent UTI - Primary    sulfamethoxazole-trimethoprim (BACTRIM) 400-80 mg per tablet      Other Visit Diagnoses     Uses oral contraceptives as primary birth control method        norethindrone-ethinyl estradiol (Blisovi FE 1/20) 1-20 MG-MCG per tablet            Subjective:     Patient ID: Martha Traylor is a 20 y o  female  This is a 20 y o  G0 with last menstrual period was 07/27/2020 (exact date) currently using Donmargarito Valarie for contraception  She has regular periods of normal amount and duration  She is sexually active and declines STD testing today  Reports she and her partner use condoms  She has no issues with intercourse  Tod Puentes does report a history of recurrent UTIs  There are 3 documented UTIs in our system over the past year - all E  Coli  She has tried post-coital macrobid but doesn't believe this is very helpful  She also take azo, cranberry tablets, and hydrates  Screening:  Last pap smear: not yet 20          Review of Systems   Constitutional: Negative  HENT: Negative  Eyes: Negative  Respiratory: Negative  Cardiovascular: Negative  Gastrointestinal: Negative  Endocrine: Negative  Genitourinary: Negative for dyspareunia, dysuria, frequency, menstrual problem, pelvic pain, vaginal discharge and vaginal pain  Musculoskeletal: Negative  Skin: Negative  Allergic/Immunologic: Negative  Neurological: Negative  Hematological: Negative  Psychiatric/Behavioral: Negative  Objective:     Physical Exam  Pulmonary:      Effort: Pulmonary effort is normal    Skin:     General: Skin is warm     Psychiatric:         Mood and Affect: Mood normal

## 2021-03-24 DIAGNOSIS — Z78.9 USES ORAL CONTRACEPTIVES AS PRIMARY BIRTH CONTROL METHOD: ICD-10-CM

## 2021-03-24 RX ORDER — NORETHINDRONE ACETATE AND ETHINYL ESTRADIOL 1MG-20(21)
1 KIT ORAL DAILY
Qty: 84 TABLET | Refills: 1 | Status: SHIPPED | OUTPATIENT
Start: 2021-03-24 | End: 2021-11-30

## 2021-08-10 ENCOUNTER — TELEPHONE (OUTPATIENT)
Dept: OBGYN CLINIC | Facility: CLINIC | Age: 21
End: 2021-08-10

## 2021-08-10 DIAGNOSIS — N39.0 RECURRENT UTI: Primary | ICD-10-CM

## 2021-08-10 RX ORDER — SULFAMETHOXAZOLE AND TRIMETHOPRIM 800; 160 MG/1; MG/1
TABLET ORAL
Qty: 14 TABLET | Refills: 0 | Status: SHIPPED | OUTPATIENT
Start: 2021-08-10 | End: 2021-08-13

## 2021-08-10 NOTE — TELEPHONE ENCOUNTER
Please send bactrim DS x 7 but if nausea/vomiting fever, there is concern for pyelonephritis  If she is able a urine and cbc would be very helpful for her treatment       IF her symptoms worsen - pain increases, fever not resolving with tylenol/ibuprofen, dehydration etc needs to go to ER

## 2021-08-10 NOTE — TELEPHONE ENCOUNTER
Patients mom is calling on behalf of patient stating she woke up with a UTI  She is experiencing burning with urination and frequency  Her mom said her daughter is very sick and cannot go to the lab for urine studies (unsure if its food poison or something else)

## 2021-11-30 DIAGNOSIS — Z78.9 USES ORAL CONTRACEPTIVES AS PRIMARY BIRTH CONTROL METHOD: ICD-10-CM

## 2021-11-30 RX ORDER — NORETHINDRONE ACETATE AND ETHINYL ESTRADIOL 1MG-20(21)
KIT ORAL
Qty: 84 TABLET | Refills: 3 | Status: SHIPPED | OUTPATIENT
Start: 2021-11-30

## 2022-03-21 ENCOUNTER — APPOINTMENT (OUTPATIENT)
Dept: LAB | Facility: HOSPITAL | Age: 22
End: 2022-03-21
Payer: COMMERCIAL

## 2022-03-21 DIAGNOSIS — J30.89 ALLERGIC RHINITIS DUE TO OTHER ALLERGIC TRIGGER, UNSPECIFIED SEASONALITY: ICD-10-CM

## 2022-03-21 LAB
BASOPHILS # BLD AUTO: 0.04 THOUSANDS/ΜL (ref 0–0.1)
BASOPHILS NFR BLD AUTO: 1 % (ref 0–1)
EOSINOPHIL # BLD AUTO: 0.33 THOUSAND/ΜL (ref 0–0.61)
EOSINOPHIL NFR BLD AUTO: 7 % (ref 0–6)
ERYTHROCYTE [DISTWIDTH] IN BLOOD BY AUTOMATED COUNT: 11.9 % (ref 11.6–15.1)
HCT VFR BLD AUTO: 40.1 % (ref 34.8–46.1)
HGB BLD-MCNC: 14.1 G/DL (ref 11.5–15.4)
IMM GRANULOCYTES # BLD AUTO: 0.01 THOUSAND/UL (ref 0–0.2)
IMM GRANULOCYTES NFR BLD AUTO: 0 % (ref 0–2)
LYMPHOCYTES # BLD AUTO: 1.6 THOUSANDS/ΜL (ref 0.6–4.47)
LYMPHOCYTES NFR BLD AUTO: 33 % (ref 14–44)
MCH RBC QN AUTO: 30.3 PG (ref 26.8–34.3)
MCHC RBC AUTO-ENTMCNC: 35.2 G/DL (ref 31.4–37.4)
MCV RBC AUTO: 86 FL (ref 82–98)
MONOCYTES # BLD AUTO: 0.38 THOUSAND/ΜL (ref 0.17–1.22)
MONOCYTES NFR BLD AUTO: 8 % (ref 4–12)
NEUTROPHILS # BLD AUTO: 2.46 THOUSANDS/ΜL (ref 1.85–7.62)
NEUTS SEG NFR BLD AUTO: 51 % (ref 43–75)
NRBC BLD AUTO-RTO: 0 /100 WBCS
PLATELET # BLD AUTO: 268 THOUSANDS/UL (ref 149–390)
PMV BLD AUTO: 8.8 FL (ref 8.9–12.7)
RBC # BLD AUTO: 4.65 MILLION/UL (ref 3.81–5.12)
WBC # BLD AUTO: 4.82 THOUSAND/UL (ref 4.31–10.16)

## 2022-03-21 PROCEDURE — 36415 COLL VENOUS BLD VENIPUNCTURE: CPT

## 2022-03-21 PROCEDURE — 86003 ALLG SPEC IGE CRUDE XTRC EA: CPT

## 2022-03-21 PROCEDURE — 85025 COMPLETE CBC W/AUTO DIFF WBC: CPT

## 2022-03-23 LAB
D FARINAE IGE QN: <0.1 KUA/I
D PTERONYSS IGE QN: <0.1 KUA/I

## 2022-09-13 PROBLEM — J30.9 ALLERGIC RHINITIS: Status: ACTIVE | Noted: 2022-09-13

## 2022-09-13 PROBLEM — H10.13 ALLERGIC CONJUNCTIVITIS OF BOTH EYES: Status: ACTIVE | Noted: 2022-09-13

## 2022-11-04 ENCOUNTER — ANNUAL EXAM (OUTPATIENT)
Dept: OBGYN CLINIC | Facility: CLINIC | Age: 22
End: 2022-11-04

## 2022-11-04 VITALS
DIASTOLIC BLOOD PRESSURE: 84 MMHG | SYSTOLIC BLOOD PRESSURE: 110 MMHG | WEIGHT: 149.8 LBS | BODY MASS INDEX: 22.7 KG/M2 | HEIGHT: 68 IN

## 2022-11-04 DIAGNOSIS — Z12.4 SCREENING FOR CERVICAL CANCER: ICD-10-CM

## 2022-11-04 DIAGNOSIS — Z78.9 USES ORAL CONTRACEPTIVES AS PRIMARY BIRTH CONTROL METHOD: ICD-10-CM

## 2022-11-04 DIAGNOSIS — Z01.419 ENCOUNTER FOR ANNUAL ROUTINE GYNECOLOGICAL EXAMINATION: Primary | ICD-10-CM

## 2022-11-04 DIAGNOSIS — Z11.3 SCREENING FOR STDS (SEXUALLY TRANSMITTED DISEASES): ICD-10-CM

## 2022-11-04 RX ORDER — NORETHINDRONE ACETATE AND ETHINYL ESTRADIOL 1MG-20(21)
1 KIT ORAL DAILY
Qty: 84 TABLET | Refills: 3 | Status: SHIPPED | OUTPATIENT
Start: 2022-11-04

## 2022-11-04 NOTE — PROGRESS NOTES
Patient is here today for a gynecological annual exam    No questions or concerns today    LMP: 10/19/2022   Menarche age: 14    BC: OCP    Last pap: Not on file    Gardasil: Unsure if she had

## 2022-11-04 NOTE — PROGRESS NOTES
Yasemin Rubio  2000    Assessment and Plan:  Yearly exam without abnormality  Encounter for annual routine gynecological examination  ASCCP guidelines reviewed- first pap done today  Safe sex practices and condom use encouraged  SBE, daily exercise and healthy diet with adequate calcium and vitamin D encouraged  Weight bearing exercises a minimum of 150 minutes/week advised  Gardisil vaccines recommended- information provided  Advised to call with any issues, all concerns & questions addressed  See provided information in your after visit summary     Uses oral contraceptives as primary birth control method  Happy with her current BCM  Discussed ACHES  Diagnoses and all orders for this visit:    Encounter for annual routine gynecological examination    Screening for cervical cancer  -     Liquid-based pap, screening    Screening for STDs (sexually transmitted diseases)  -     Chlamydia/GC amplified DNA by PCR    Uses oral contraceptives as primary birth control method  -     norethindrone-ethinyl estradiol (Blisovi FE ) 1-20 MG-MCG per tablet; Take 1 tablet by mouth daily      F/U Annually and PRN    Results will be released to [facility], if abnormal will call or message to review and discuss treatment plan  Health Maintenance:    First pap done today  Gardasil Vaccine Series: She has not had the Gardasil series  Gardasil 9 was advised for prevention of HPV-related disease  We discussed risks/benefits, SE's/AE's at length and all questions were answered  Written info was provided for review  The patient agrees to consider and is aware she may call to schedule injection #1 at any time  Subjective    CC: Yearly Exam     Yasemin Rubio is a 20 y o  female  here for an annual exam   She has no concerns today  Menarche: 14    Patient's last menstrual period was 10/19/2022 (exact date)  Contraception: combination OCPs     Non-smoker, social drinker  Exercise: sports, does snowboarding  Her menstrual cycles are regular every 28-30 days  She denies any issues with bleeding or her menses  Sexual activity: She is sexually active without pain, bleeding or dryness  STD testing:  She does want STD testing today  She denies breast concerns, abnormal vaginal discharge, vaginal itching, odor, irritation, bowel/bladder dysfunction, urinary symptoms, pelvic pain, or dyspareunia today  She graduated from [address] [place] [address] in [address] [facility]  She is hoping to get a job as a        Family hx of breast cancer: No  Family hx of ovarian cancer: No  Family hx of colon cancer: No    Past Medical History:   Diagnosis Date   • Urinary tract infection      Past Surgical History:   Procedure Laterality Date   • NO PAST SURGERIES         Immunization History   Administered Date(s) Administered   • DTaP 5 2000, 01/24/2000, 04/17/2000, 06/21/2001, 03/14/2005   • Hepatitis A, Pediatric 05/03/2000, 07/24/2000, 03/07/2001   • IPV 1999, 02/29/2000, 06/12/2001, 03/14/2005   • MMR 04/23/2001, 05/02/2005   • Meningococcal, Unknown Serogroups 11/11/2010, 08/31/2017   • Tdap 11/11/2010   • Varicella 11/03/2000, 11/13/2008       Family History   Problem Relation Age of Onset   • Arthritis Mother    • Hypertension Father         benign essential   • Breast cancer Neg Hx    • Colon cancer Neg Hx    • Ovarian cancer Neg Hx    • Uterine cancer Neg Hx    • Cervical cancer Neg Hx      Social History     Tobacco Use   • Smoking status: Never Smoker   • Smokeless tobacco: Never Used   Vaping Use   • Vaping Use: Never used   Substance Use Topics   • Alcohol use: No     Comment: socially   • Drug use: No       Current Outpatient Medications:   •  cetirizine (ZyrTEC) 10 mg tablet, Take 10 mg by mouth daily, Disp: , Rfl:   •  fluticasone (FLONASE) 50 mcg/act nasal spray, 1 spray into each nostril 2 (two) times a day, Disp: , Rfl:   •  norethindrone-ethinyl estradiol (Blisovi FE ) 1-20 MG-MCG per tablet, Take 1 tablet by mouth daily, Disp: 84 tablet, Rfl: 3  Patient Active Problem List    Diagnosis Date Noted   • Encounter for annual routine gynecological examination 2022   • Uses oral contraceptives as primary birth control method 2022   • Allergic rhinitis 2022   • Allergic conjunctivitis of both eyes 2022   • UTI symptoms 2019   • History of frequent urinary tract infections 2019   • Sensation of lump in throat 2018   • Pharyngitis 2018   • Cough 2018   • Disturbance, sleep 2018   • Recurrent UTI 2018   • Vaginal discomfort 2018   • Dysuria-frequency syndrome 2018   • Acne 2016       No Known Allergies    OB History    Para Term  AB Living   0 0 0 0 0 0   SAB IAB Ectopic Multiple Live Births   0 0 0 0 0       Vitals:    22 0938   BP: 110/84   BP Location: Right arm   Patient Position: Sitting   Cuff Size: Standard   Weight: 67 9 kg (149 lb 12 8 oz)   Height: 5' 8" (1 727 m)     Body mass index is 22 78 kg/m²  Review of Systems   Constitutional: Negative for chills, fatigue, fever and unexpected weight change  Respiratory: Negative for shortness of breath  Cardiovascular: Negative for chest pain  Gastrointestinal: Negative for abdominal pain, constipation, diarrhea, nausea and vomiting  Endocrine: Negative  Genitourinary: Negative for difficulty urinating, dyspareunia, dysuria, frequency, genital sores, hematuria, menstrual problem, pelvic pain, urgency, vaginal bleeding, vaginal discharge and vaginal pain  Musculoskeletal: Negative for back pain and myalgias  Skin: Negative for pallor and rash  Neurological: Negative for headaches  Psychiatric/Behavioral: Negative for dysphoric mood  All other systems reviewed and are negative  Physical Exam  Constitutional:       General: She is not in acute distress       Appearance: Normal appearance  She is not ill-appearing  Genitourinary:      Bladder and urethral meatus normal       No lesions in the vagina  Right Labia: No rash, tenderness, lesions, skin changes or Bartholin's cyst      Left Labia: No tenderness, lesions, skin changes, Bartholin's cyst or rash  No inguinal adenopathy present in the right or left side  No vaginal discharge, erythema, tenderness, bleeding or ulceration  Right Adnexa: not tender, not full and no mass present  Left Adnexa: not tender, not full and no mass present  Cervix is nulliparous  No cervical motion tenderness, discharge, friability, lesion, polyp or nabothian cyst       Uterus is not enlarged, fixed or tender  No uterine mass detected  No urethral prolapse, tenderness or discharge present  Bladder is not tender and urgency on palpation not present  Pelvic exam was performed with patient in the lithotomy position  Breasts:      Right: No swelling, inverted nipple, mass, nipple discharge, skin change, tenderness, axillary adenopathy or supraclavicular adenopathy  Left: No swelling, inverted nipple, mass, nipple discharge, skin change, tenderness, axillary adenopathy or supraclavicular adenopathy  HENT:      Head: Normocephalic and atraumatic  Eyes:      Conjunctiva/sclera: Conjunctivae normal    Pulmonary:      Effort: Pulmonary effort is normal    Abdominal:      General: There is no distension  Palpations: Abdomen is soft  Tenderness: There is no abdominal tenderness  Musculoskeletal:         General: Normal range of motion  Cervical back: Neck supple  Lymphadenopathy:      Upper Body:      Right upper body: No supraclavicular or axillary adenopathy  Left upper body: No supraclavicular or axillary adenopathy  Lower Body: No right inguinal adenopathy  No left inguinal adenopathy     Neurological:      Mental Status: She is alert and oriented to person, place, and time    Skin:     General: Skin is warm and dry  Psychiatric:         Mood and Affect: Mood normal          Behavior: Behavior normal          Thought Content: Thought content normal          Judgment: Judgment normal    Vitals and nursing note reviewed

## 2022-11-04 NOTE — ASSESSMENT & PLAN NOTE
ASCCP guidelines reviewed- first pap done today  Safe sex practices and condom use encouraged  SBE, daily exercise and healthy diet with adequate calcium and vitamin D encouraged  Weight bearing exercises a minimum of 150 minutes/week advised  Gardisil vaccines recommended- information provided  Advised to call with any issues, all concerns & questions addressed     See provided information in your after visit summary

## 2022-11-04 NOTE — PATIENT INSTRUCTIONS
Birth Control Pills   WHAT YOU NEED TO KNOW:   What are birth control pills? Birth control pills are also called oral contraceptives, or the pill  It is medicine that helps prevent pregnancy by stopping ovulation  Ovulation is when the ovaries make and release an egg cell each month  If this egg gets fertilized by sperm, pregnancy occurs  You will need to take the pill at the same time every day  Your healthcare provider will tell you when to start taking the pill  You will also be told what to do if you miss a dose  Instructions will depend on the kind of birth control pills you are taking  What are the different kinds of birth control pills? Some kinds are taken for 21 days in a row, followed by 7 days of placebo (no hormones) pills  Other kinds are taken for 24 days followed by 4 days of placebos  Each kind has a certain amount of female hormones  Your provider will decide on the kind that is best for you based on your age and other health conditions  What may be done before I can start taking birth control pills? You need to see your healthcare provider to get a prescription  Any of the following may be done before your healthcare provider gives you a prescription:  Your healthcare provider will ask about diseases and illnesses you have had in the past  Your provider will check your risk for blood clots, heart conditions, or stroke  Tell your provider if you had gastric bypass surgery  This surgery can affect the way your body absorbs medicines such as birth control pills  Your provider will also check your blood pressure, and may do a breast and pelvic exam  A Pap smear may also be done during the pelvic exam  This is a test to make sure you do not have abnormal changes on your cervix  You may need other tests, such as a urine test to make sure you are not pregnant  Your provider will ask if you take any medicines and if you smoke   Smoking increases your risk for stroke, heart attack, or a blood clot in your lungs  If you smoke, you should not take certain kinds of birth control pills  What are the advantages of birth control pills? When birth control pills are used correctly, the chances of getting pregnant are very low  Birth control pills may help decrease bleeding and pain during your monthly period  They may also help prevent cancer of the uterus and ovaries  What are the disadvantages of birth control pills? You may have sudden changes in your mood or feelings while you take birth control pills  You may have nausea and a decreased sex drive  You may have an increased appetite and rapid weight gain  You may also have bleeding in between periods, less frequent periods, vaginal dryness, and breast pain  Birth control pills will not protect you from sexually transmitted infections  Rarely, some birth control pills can increase your risk for a blood clot  This may become life-threatening  What should I do if I decide I want to get pregnant? If you are planning to have a baby, ask your healthcare provider when you may stop taking your birth control pills  It may take some time for you to start ovulating again  Ask your healthcare provider for more information about pregnancy after birth control pills  When should I start taking birth control pills after I have a baby? If you are not breastfeeding, you may start taking birth control pills 3 weeks after you give birth  You may be able to take certain types of birth control pills if you are breastfeeding  These pills can be started from 6 weeks to 6 months after you give birth  Ask your healthcare provider for more information about when to start taking birth control pills after you give birth  What do I need to know about birth control pills and menopause? Talk with your healthcare provider if you want to take birth control pills around menopause  Around age 39, you will enter into perimenopause   This means your hormone levels are dropping and you are ovulating less often  You can still become pregnant during this time  The risk for problems, such as miscarriage, are higher if you become pregnant after age 39  Birth control pills will prevent pregnancy, and may also help prevent or relieve some signs and symptoms of menopause  Examples are hot flashes and mood swings  Your provider will do tests when you are around age 48  The tests may show that you are in menopause  If the tests do not show menopause for sure, you may be able to continue taking the pill up to age 54  The decision will depend on your health and if you have any medical conditions, such as a blood clot  Call your local emergency number (911 in the [address]) for any of the following: You have any of the following signs of a stroke:      Numbness or drooping on one side of your face     Weakness in an arm or leg    Confusion or difficulty speaking    Dizziness, a severe headache, or vision loss    You feel lightheaded, short of breath, and have chest pain  You cough up blood  When should I seek immediate care? Your arm or leg feels warm, tender, and painful  It may look swollen and red  You have severe pain, numbness, or swelling in your arms or legs  When should I call my doctor? You have forgotten to take a birth control pill  You have mood changes, such as depression, since starting birth control pills  You have nausea or are vomiting  You have severe abdominal pain  You missed a period and have questions or concerns about being pregnant  You still have bleeding 4 months after taking birth control pills correctly  You have questions or concerns about your condition or care  CARE AGREEMENT:   You have the right to help plan your care  Learn about your health condition and how it may be treated  Discuss treatment options with your healthcare providers to decide what care you want to receive  You always have the right to refuse treatment   The above information is an  only  It is not intended as medical advice for individual conditions or treatments  Talk to your doctor, nurse or pharmacist before following any medical regimen to see if it is safe and effective for you  © Copyright "[employer] 2022 Information is for End User's use only and may not be sold, redistributed or otherwise used for commercial purposes  All illustrations and images included in CareNotes® are the copyrighted property of A D A M [employer] Inc  or Eamon Pike  HPV (Human Papillomavirus)   AMBULATORY CARE:   Human Papillomavirus (HPV)  is the most common infection spread by sexual contact  It can also be spread from a mother to her baby during delivery  HPV may cause oral and genital warts or tumors in your nose, mouth, throat, and lungs  HPV may also cause vaginal, penile, and anal cancers  You may not show symptoms of any of these conditions for several years after being exposed to HPV  Common symptoms include the following:   Painless warts    Genital or anal discharge, bleeding, itching, or pain    Pain when you urinate    Call your doctor if:   You have warts in your genital or anal area  You have genital or anal discharge, bleeding, itching, or pain  You have pain when you urinate  You have questions or concerns about your condition or care  HPV diagnosis:  Your healthcare provider may use a vinegar liquid to help diagnose HPV genital warts  Women 27to 72years old can be checked for HPV during regular cervical cancer screenings  An HPV test checks for certain types of HPV that can cause changes in cervical cells  Without treatment, the changed cells can become cancer  An HPV test can be done every 5 years if the results show no infection  The test can be done with or without a Pap smear  A Pap smear checks for cancer or for abnormal cells that can become cancer   You may be tested for HPV if you are diagnosed with a mouth or throat cancer  Treatment:  HPV cannot be cured  Conditions caused by HPV can be treated  You will need to be monitored closely for these conditions  Ask your healthcare provider for more information about monitoring, conditions caused by HPV, and available treatments  Prevent HPV infection:   Ask about the HPV vaccine  The vaccine can help protect against HPV infection  Females and males can receive the vaccine  It is most effective if given before sexual activity begins  This allows the body to build almost complete protection against HPV before contact with the virus  The vaccine is usually given at 6or 15years of age but may be given as early as 5 years  The vaccine can be given through age 39  Always use a condom during intercourse  A condom will not completely protect you from HPV infection, but it will help lower your risk  Use a new condom or latex barrier each time you have sex  This includes oral, vaginal, and anal sex  Make sure the condom fits and is put on correctly  Rubber latex sheets or dental dams can be used for oral sex  If you are allergic to latex, use a nonlatex product such as polyurethane  Follow up with your healthcare provider as directed:  Write down your questions so you remember to ask them during your visits  © Copyright [employer] 2022 Information is for End User's use only and may not be sold, redistributed or otherwise used for commercial purposes  All illustrations and images included in CareNotes® are the copyrighted property of [employer]  or Eamon Pike  The above information is an  only  It is not intended as medical advice for individual conditions or treatments  Talk to your doctor, nurse or pharmacist before following any medical regimen to see if it is safe and effective for you

## 2022-11-07 LAB
C TRACH DNA SPEC QL NAA+PROBE: NEGATIVE
N GONORRHOEA DNA SPEC QL NAA+PROBE: NEGATIVE

## 2022-11-11 ENCOUNTER — OFFICE VISIT (OUTPATIENT)
Dept: FAMILY MEDICINE CLINIC | Facility: CLINIC | Age: 22
End: 2022-11-11

## 2022-11-11 ENCOUNTER — APPOINTMENT (OUTPATIENT)
Dept: LAB | Facility: HOSPITAL | Age: 22
End: 2022-11-11

## 2022-11-11 VITALS
OXYGEN SATURATION: 98 % | SYSTOLIC BLOOD PRESSURE: 108 MMHG | TEMPERATURE: 97.4 F | BODY MASS INDEX: 22.43 KG/M2 | HEIGHT: 68 IN | WEIGHT: 148 LBS | DIASTOLIC BLOOD PRESSURE: 74 MMHG | HEART RATE: 75 BPM

## 2022-11-11 DIAGNOSIS — R53.83 FATIGUE, UNSPECIFIED TYPE: ICD-10-CM

## 2022-11-11 DIAGNOSIS — Z00.00 ANNUAL PHYSICAL EXAM: Primary | ICD-10-CM

## 2022-11-11 DIAGNOSIS — G47.9 DISTURBANCE, SLEEP: ICD-10-CM

## 2022-11-11 PROBLEM — H10.13 ALLERGIC CONJUNCTIVITIS OF BOTH EYES: Status: RESOLVED | Noted: 2022-09-13 | Resolved: 2022-11-11

## 2022-11-11 PROBLEM — N34.3 DYSURIA-FREQUENCY SYNDROME: Status: RESOLVED | Noted: 2018-03-13 | Resolved: 2022-11-11

## 2022-11-11 PROBLEM — R39.9 UTI SYMPTOMS: Status: RESOLVED | Noted: 2019-11-14 | Resolved: 2022-11-11

## 2022-11-11 PROBLEM — J02.9 PHARYNGITIS: Status: RESOLVED | Noted: 2018-12-04 | Resolved: 2022-11-11

## 2022-11-11 PROBLEM — R09.A2 SENSATION OF LUMP IN THROAT: Status: RESOLVED | Noted: 2018-12-14 | Resolved: 2022-11-11

## 2022-11-11 PROBLEM — R05.9 COUGH: Status: RESOLVED | Noted: 2018-12-04 | Resolved: 2022-11-11

## 2022-11-11 PROBLEM — N39.0 RECURRENT UTI: Status: RESOLVED | Noted: 2018-04-04 | Resolved: 2022-11-11

## 2022-11-11 PROBLEM — R22.1 SENSATION OF LUMP IN THROAT: Status: RESOLVED | Noted: 2018-12-14 | Resolved: 2022-11-11

## 2022-11-11 LAB
ALBUMIN SERPL BCP-MCNC: 4.1 G/DL (ref 3.5–5)
ALP SERPL-CCNC: 44 U/L (ref 46–116)
ALT SERPL W P-5'-P-CCNC: 13 U/L (ref 12–78)
ANION GAP SERPL CALCULATED.3IONS-SCNC: 4 MMOL/L (ref 4–13)
AST SERPL W P-5'-P-CCNC: 12 U/L (ref 5–45)
BASOPHILS # BLD AUTO: 0.05 THOUSANDS/ÂΜL (ref 0–0.1)
BASOPHILS NFR BLD AUTO: 1 % (ref 0–1)
BILIRUB SERPL-MCNC: 0.54 MG/DL (ref 0.2–1)
BUN SERPL-MCNC: 8 MG/DL (ref 5–25)
CALCIUM SERPL-MCNC: 9.2 MG/DL (ref 8.3–10.1)
CHLORIDE SERPL-SCNC: 107 MMOL/L (ref 96–108)
CO2 SERPL-SCNC: 25 MMOL/L (ref 21–32)
CREAT SERPL-MCNC: 0.79 MG/DL (ref 0.6–1.3)
EOSINOPHIL # BLD AUTO: 0.15 THOUSAND/ÂΜL (ref 0–0.61)
EOSINOPHIL NFR BLD AUTO: 3 % (ref 0–6)
ERYTHROCYTE [DISTWIDTH] IN BLOOD BY AUTOMATED COUNT: 11.7 % (ref 11.6–15.1)
GFR SERPL CREATININE-BSD FRML MDRD: 105 ML/MIN/1.73SQ M
GLUCOSE P FAST SERPL-MCNC: 98 MG/DL (ref 65–99)
HCT VFR BLD AUTO: 39.9 % (ref 34.8–46.1)
HGB BLD-MCNC: 13.7 G/DL (ref 11.5–15.4)
IMM GRANULOCYTES # BLD AUTO: 0 THOUSAND/UL (ref 0–0.2)
IMM GRANULOCYTES NFR BLD AUTO: 0 % (ref 0–2)
LAB AP GYN PRIMARY INTERPRETATION: NORMAL
LYMPHOCYTES # BLD AUTO: 1.67 THOUSANDS/ÂΜL (ref 0.6–4.47)
LYMPHOCYTES NFR BLD AUTO: 31 % (ref 14–44)
Lab: NORMAL
MCH RBC QN AUTO: 30.1 PG (ref 26.8–34.3)
MCHC RBC AUTO-ENTMCNC: 34.3 G/DL (ref 31.4–37.4)
MCV RBC AUTO: 88 FL (ref 82–98)
MONOCYTES # BLD AUTO: 0.44 THOUSAND/ÂΜL (ref 0.17–1.22)
MONOCYTES NFR BLD AUTO: 8 % (ref 4–12)
NEUTROPHILS # BLD AUTO: 3.16 THOUSANDS/ÂΜL (ref 1.85–7.62)
NEUTS SEG NFR BLD AUTO: 57 % (ref 43–75)
NRBC BLD AUTO-RTO: 0 /100 WBCS
PATH INTERP SPEC-IMP: NORMAL
PLATELET # BLD AUTO: 305 THOUSANDS/UL (ref 149–390)
PMV BLD AUTO: 8.7 FL (ref 8.9–12.7)
POTASSIUM SERPL-SCNC: 4.1 MMOL/L (ref 3.5–5.3)
PROT SERPL-MCNC: 7.3 G/DL (ref 6.4–8.4)
RBC # BLD AUTO: 4.55 MILLION/UL (ref 3.81–5.12)
SODIUM SERPL-SCNC: 136 MMOL/L (ref 135–147)
TSH SERPL DL<=0.05 MIU/L-ACNC: 2.09 UIU/ML (ref 0.45–4.5)
WBC # BLD AUTO: 5.47 THOUSAND/UL (ref 4.31–10.16)

## 2022-11-11 RX ORDER — TRAZODONE HYDROCHLORIDE 50 MG/1
50 TABLET ORAL
Qty: 30 TABLET | Refills: 1 | Status: SHIPPED | OUTPATIENT
Start: 2022-11-11

## 2022-11-11 NOTE — PATIENT INSTRUCTIONS

## 2022-11-11 NOTE — PROGRESS NOTES
[address]     NAME: Daneyll King  AGE: 20 y o  SEX: female  : 2000     DATE: 2022     Assessment and Plan:     Problem List Items Addressed This Visit        Other    Disturbance, sleep    Relevant Medications    traZODone (DESYREL) 50 mg tablet    Annual physical exam - Primary    Fatigue    Relevant Orders    CBC and differential    Comprehensive metabolic panel    TSH, 3rd generation with Free T4 reflex          Immunizations and preventive care screenings were discussed with patient today  Appropriate education was printed on patient's after visit summary  Counseling:  Dental Health: discussed importance of regular tooth brushing, flossing, and dental visits  Injury prevention: discussed safety/seat belts, safety helmets, smoke detectors, carbon dioxide detectors, and smoking near bedding or upholstery  · Exercise: the importance of regular exercise/physical activity was discussed  Recommend exercise 3-5 times per week for at least 30 minutes  Depression Screening and Follow-up Plan: Patient was screened for depression during today's encounter  They screened negative with a PHQ-2 score of 0  Return in 1 year (on 2023)  Chief Complaint:     Chief Complaint   Patient presents with   • Physical Exam   • HM     Pt declined the flu vaccine  History of Present Illness:     Adult Annual Physical   Patient here for a comprehensive physical exam  The patient reports problems - insomnia, fatigue, irritability   Diet and Physical Activity  · Diet/Nutrition: well balanced diet  · Exercise: no formal exercise        Depression Screening  PHQ-2/9 Depression Screening    Little interest or pleasure in doing things: 0 - not at all  Feeling down, depressed, or hopeless: 0 - not at all  PHQ-2 Score: 0  PHQ-2 Interpretation: Negative depression screen       General Health  · Sleep: sleeps poorly  · Hearing: normal - bilateral   · Vision: goes for regular eye exams and wears glasses  · Dental: regular dental visits  /GYN Health  · Last menstrual period: 10/13/2022  · Contraceptive method: oral contraceptives  · History of STDs?: no      Review of Systems:     Review of Systems   Constitutional: Positive for fatigue  Negative for appetite change, chills, diaphoresis, fever and unexpected weight change  HENT: Negative for congestion, dental problem, ear pain, hearing loss, mouth sores, nosebleeds, postnasal drip, rhinorrhea, tinnitus, trouble swallowing and voice change  Eyes: Negative for photophobia, pain, discharge and visual disturbance  Respiratory: Negative for cough, chest tightness, shortness of breath and wheezing  Cardiovascular: Negative for chest pain and palpitations  Gastrointestinal: Negative for abdominal pain, blood in stool, constipation, diarrhea, nausea, rectal pain and vomiting  Endocrine: Negative for cold intolerance, polydipsia, polyphagia and polyuria  Genitourinary: Negative for decreased urine volume, difficulty urinating, dysuria, enuresis, frequency, genital sores, hematuria and urgency  Musculoskeletal: Negative for arthralgias, back pain, gait problem, joint swelling, myalgias, neck pain and neck stiffness  Skin: Negative for color change and rash  Allergic/Immunologic: Negative for environmental allergies, food allergies and immunocompromised state  Neurological: Negative for dizziness, seizures, speech difficulty, light-headedness and headaches  Hematological: Negative for adenopathy  Does not bruise/bleed easily  Psychiatric/Behavioral: Positive for sleep disturbance  Negative for behavioral problems, confusion, decreased concentration and self-injury  The patient is nervous/anxious  The patient is not hyperactive         Past Medical History:     Past Medical History:   Diagnosis Date   • Urinary tract infection       Past Surgical History:     Past Surgical History:   Procedure Laterality Date   • NO PAST SURGERIES        Social History:     Social History     Socioeconomic History   • Marital status: Single     Spouse name: None   • Number of children: None   • Years of education: None   • Highest education level: Bachelor's degree (e g , BA, AB, BS)   Occupational History   • None   Tobacco Use   • Smoking status: Never Smoker   • Smokeless tobacco: Never Used   Vaping Use   • Vaping Use: Never used   Substance and Sexual Activity   • Alcohol use: No     Comment: socially   • Drug use: No   • Sexual activity: Yes     Partners: Male     Birth control/protection: OCP   Other Topics Concern   • None   Social History Narrative        What type of home do you live in: Single house    Age of your home: 19    Type of heat: Oil    What type of fatoumata is in your bedroom: Carpet    Air products: Central air    Pets: Dog    Symptoms caused by pets: no    Are pets allowed in bedroom: Yes    Basement: None    Mold:no    Mattress covered by allergy cover: no    Pillows covered by allergy cover: no    Linens and bedding washed in hot water: yes    Exposure to second hand smoke: No        Habits:    Caffeine:    Chocolate:      Other:Lives with parents    Seeing a dentist     Social Determinants of Health     Financial Resource Strain: Not on file   Food Insecurity: Not on file   Transportation Needs: Not on file   Physical Activity: Not on file   Stress: Not on file   Social Connections: Not on file   Intimate Partner Violence: Not on file   Housing Stability: Not on file      Family History:     Family History   Problem Relation Age of Onset   • Arthritis Mother    • Hypertension Father         benign essential   • Breast cancer Neg Hx    • Colon cancer Neg Hx    • Ovarian cancer Neg Hx    • Uterine cancer Neg Hx    • Cervical cancer Neg Hx       Current Medications:     Current Outpatient Medications   Medication Sig Dispense Refill   • fluticasone (FLONASE) 50 mcg/act nasal spray 1 spray into each nostril 2 (two) times a day     • norethindrone-ethinyl estradiol (Blisovi FE 1/20) 1-20 MG-MCG per tablet Take 1 tablet by mouth daily 84 tablet 3   • traZODone (DESYREL) 50 mg tablet Take 1 tablet (50 mg total) by mouth daily at bedtime 30 tablet 1     No current facility-administered medications for this visit  Allergies:     No Known Allergies   Physical Exam:     /74 (BP Location: Left arm, Patient Position: Sitting, Cuff Size: Adult)   Pulse 75   Temp (!) 97 4 °F (36 3 °C)   Ht 5' 8" (1 727 m)   Wt 67 1 kg (148 lb)   LMP 10/19/2022 (Exact Date)   SpO2 98%   BMI 22 50 kg/m²     Physical Exam  Vitals and nursing note reviewed  Constitutional:       Appearance: Normal appearance  She is normal weight  HENT:      Head: Normocephalic and atraumatic  Right Ear: Tympanic membrane, ear canal and external ear normal       Left Ear: Tympanic membrane, ear canal and external ear normal       Nose: Nose normal       Mouth/Throat:      Mouth: Mucous membranes are moist       Pharynx: Oropharynx is clear  Eyes:      Extraocular Movements: Extraocular movements intact  Conjunctiva/sclera: Conjunctivae normal    Neck:      Thyroid: No thyromegaly  Vascular: No carotid bruit  Cardiovascular:      Rate and Rhythm: Normal rate and regular rhythm  Pulses: Normal pulses  Heart sounds: Normal heart sounds  Pulmonary:      Effort: Pulmonary effort is normal       Breath sounds: Normal breath sounds  Abdominal:      General: Abdomen is flat  Bowel sounds are normal       Palpations: Abdomen is soft  There is no hepatomegaly, splenomegaly or mass  Tenderness: There is no abdominal tenderness  There is no right CVA tenderness or left CVA tenderness  Musculoskeletal:         General: Normal range of motion  Cervical back: Normal range of motion and neck supple        Right lower leg: No edema  Left lower leg: No edema  Lymphadenopathy:      Cervical: No cervical adenopathy  Skin:     General: Skin is warm and dry  Neurological:      General: No focal deficit present  Mental Status: She is alert and oriented to person, place, and time  Psychiatric:         Mood and Affect: Mood normal          Behavior: Behavior normal          Thought Content:  Thought content normal          Judgment: Judgment normal           [id number]

## 2022-12-30 ENCOUNTER — TELEPHONE (OUTPATIENT)
Dept: OBGYN CLINIC | Facility: CLINIC | Age: 22
End: 2022-12-30

## 2022-12-30 DIAGNOSIS — Z78.9 USES ORAL CONTRACEPTIVES AS PRIMARY BIRTH CONTROL METHOD: ICD-10-CM

## 2023-01-03 RX ORDER — NORETHINDRONE ACETATE AND ETHINYL ESTRADIOL 1MG-20(21)
1 KIT ORAL DAILY
Qty: 84 TABLET | Refills: 3 | Status: SHIPPED | OUTPATIENT
Start: 2023-01-03

## 2023-02-21 ENCOUNTER — TELEPHONE (OUTPATIENT)
Dept: FAMILY MEDICINE CLINIC | Facility: CLINIC | Age: 23
End: 2023-02-21

## 2023-02-21 NOTE — TELEPHONE ENCOUNTER
T/c from pts mom -- reports pt fell snowboarding 2 days ago and her neck and lower back hurt very bad and she has a headache  Are trying to avoid having to come for an appt (adivsed that may be needed)  Are requesting Cecilia put in xray orders      Please call pts mom @ [phone]

## 2023-02-21 NOTE — TELEPHONE ENCOUNTER
Notified pt's mom - since no appts are available this afternoon, pt's mom said she will take her to the ER

## 2023-12-01 DIAGNOSIS — Z78.9 USES ORAL CONTRACEPTIVES AS PRIMARY BIRTH CONTROL METHOD: ICD-10-CM

## 2023-12-01 RX ORDER — NORETHINDRONE ACETATE AND ETHINYL ESTRADIOL AND FERROUS FUMARATE 1MG-20(21)
1 KIT ORAL DAILY
Qty: 84 TABLET | Refills: 0 | Status: SHIPPED | OUTPATIENT
Start: 2023-12-01

## 2024-01-05 ENCOUNTER — OFFICE VISIT (OUTPATIENT)
Dept: FAMILY MEDICINE CLINIC | Facility: CLINIC | Age: 24
End: 2024-01-05
Payer: COMMERCIAL

## 2024-01-05 VITALS
HEART RATE: 79 BPM | SYSTOLIC BLOOD PRESSURE: 110 MMHG | TEMPERATURE: 98.1 F | BODY MASS INDEX: 24.86 KG/M2 | WEIGHT: 164 LBS | DIASTOLIC BLOOD PRESSURE: 78 MMHG | OXYGEN SATURATION: 98 % | HEIGHT: 68 IN

## 2024-01-05 DIAGNOSIS — Z13.29 SCREENING FOR THYROID DISORDER: ICD-10-CM

## 2024-01-05 DIAGNOSIS — Z00.00 ANNUAL PHYSICAL EXAM: Primary | ICD-10-CM

## 2024-01-05 DIAGNOSIS — F41.1 GENERALIZED ANXIETY DISORDER: ICD-10-CM

## 2024-01-05 DIAGNOSIS — Z13.0 SCREENING FOR DEFICIENCY ANEMIA: ICD-10-CM

## 2024-01-05 DIAGNOSIS — R53.83 FATIGUE, UNSPECIFIED TYPE: ICD-10-CM

## 2024-01-05 PROCEDURE — 99395 PREV VISIT EST AGE 18-39: CPT | Performed by: PHYSICIAN ASSISTANT

## 2024-01-05 PROCEDURE — 99213 OFFICE O/P EST LOW 20 MIN: CPT | Performed by: PHYSICIAN ASSISTANT

## 2024-01-05 RX ORDER — ESCITALOPRAM OXALATE 5 MG/1
5 TABLET ORAL DAILY
Qty: 30 TABLET | Refills: 5 | Status: SHIPPED | OUTPATIENT
Start: 2024-01-05

## 2024-01-05 NOTE — PATIENT INSTRUCTIONS
Wellness Visit for Adults   AMBULATORY CARE:   A wellness visit  is when you see your healthcare provider to get screened for health problems. Your healthcare provider will also give you advice on how to stay healthy. Write down your questions so you remember to ask them. Ask your healthcare provider how often you should have a wellness visit.  What happens at a wellness visit:  Your healthcare provider will ask about your health, and your family history of health problems. This includes high blood pressure, heart disease, and cancer. He or she will ask if you have symptoms that concern you, if you smoke, and about your mood. You may also be asked about your intake of medicines, supplements, food, and alcohol. Any of the following may be done:  Your weight  will be checked. Your height may also be checked so your body mass index (BMI) can be calculated. Your BMI shows if you are at a healthy weight.    Your blood pressure  and heart rate will be checked. Your temperature may also be checked.    Blood and urine tests  may be done. Blood tests may be done to check your cholesterol levels. Abnormal cholesterol levels increase your risk for heart disease and stroke. You may also need a blood or urine test to check for diabetes if you are at increased risk. Urine tests may be done to look for signs of an infection or kidney disease.    A physical exam  includes checking your heartbeat and lungs with a stethoscope. Your healthcare provider may also check your skin to look for sun damage.    Screening tests  may be recommended. A screening test is done to check for diseases that may not cause symptoms. The screening tests you may need depend on your age, gender, family history, and lifestyle habits. For example, colorectal screening may be recommended if you are 50 years old or older.    Screening tests you need if you are a woman:   A Pap smear  is used to screen for cervical cancer. Pap smears are usually done every 3 to  5 years depending on your age. You may need them more often if you have had abnormal Pap smear test results in the past. Ask your healthcare provider how often you should have a Pap smear.    A mammogram  is an x-ray of your breasts to screen for breast cancer. Experts recommend mammograms every 2 years starting at age 50 years. You may need a mammogram at age 49 years or younger if you have an increased risk for breast cancer. Talk to your healthcare provider about when you should start having mammograms and how often you need them.    Vaccines you may need:   Get an influenza vaccine  every year. The influenza vaccine protects you from the flu. Several types of viruses cause the flu. The viruses change over time, so new vaccines are made each year.    Get a tetanus-diphtheria (Td) booster vaccine  every 10 years. This vaccine protects you against tetanus and diphtheria. Tetanus is a severe infection that may cause painful muscle spasms and lockjaw. Diphtheria is a severe bacterial infection that causes a thick covering in the back of your mouth and throat.    Get a human papillomavirus (HPV) vaccine  if you are female and aged 19 to 26 or male 19 to 21 and never received it. This vaccine protects you from HPV infection. HPV is the most common infection spread by sexual contact. HPV may also cause vaginal, penile, and anal cancers.    Get a pneumococcal vaccine  if you are aged 65 years or older. The pneumococcal vaccine is an injection given to protect you from pneumococcal disease. Pneumococcal disease is an infection caused by pneumococcal bacteria. The infection may cause pneumonia, meningitis, or an ear infection.    Get a shingles vaccine  if you are 60 or older, even if you have had shingles before. The shingles vaccine is an injection to protect you from the varicella-zoster virus. This is the same virus that causes chickenpox. Shingles is a painful rash that develops in people who had chickenpox or have  been exposed to the virus.    How to eat healthy:  My Plate is a model for planning healthy meals. It shows the types and amounts of foods that should go on your plate. Fruits and vegetables make up about half of your plate, and grains and protein make up the other half. A serving of dairy is included on the side of your plate. The amount of calories and serving sizes you need depends on your age, gender, weight, and height. Examples of healthy foods are listed below:  Eat a variety of vegetables  such as dark green, red, and orange vegetables. You can also include canned vegetables low in sodium (salt) and frozen vegetables without added butter or sauces.    Eat a variety of fresh fruits , canned fruit in 100% juice, frozen fruit, and dried fruit.    Include whole grains.  At least half of the grains you eat should be whole grains. Examples include whole-wheat bread, wheat pasta, brown rice, and whole-grain cereals such as oatmeal.    Eat a variety of protein foods such as seafood (fish and shellfish), lean meat, and poultry without skin (turkey and chicken). Examples of lean meats include pork leg, shoulder, or tenderloin, and beef round, sirloin, tenderloin, and extra lean ground beef. Other protein foods include eggs and egg substitutes, beans, peas, soy products, nuts, and seeds.    Choose low-fat dairy products such as skim or 1% milk or low-fat yogurt, cheese, and cottage cheese.    Limit unhealthy fats  such as butter, hard margarine, and shortening.       Exercise:  Exercise at least 30 minutes per day on most days of the week. Some examples of exercise include walking, biking, dancing, and swimming. You can also fit in more physical activity by taking the stairs instead of the elevator or parking farther away from stores. Include muscle strengthening activities 2 days each week. Regular exercise provides many health benefits. It helps you manage your weight, and decreases your risk for type 2 diabetes,  heart disease, stroke, and high blood pressure. Exercise can also help improve your mood. Ask your healthcare provider about the best exercise plan for you.       General health and safety guidelines:   Do not smoke.  Nicotine and other chemicals in cigarettes and cigars can cause lung damage. Ask your healthcare provider for information if you currently smoke and need help to quit. E-cigarettes or smokeless tobacco still contain nicotine. Talk to your healthcare provider before you use these products.    Limit alcohol.  A drink of alcohol is 12 ounces of beer, 5 ounces of wine, or 1½ ounces of liquor.    Lose weight, if needed.  Being overweight increases your risk of certain health conditions. These include heart disease, high blood pressure, type 2 diabetes, and certain types of cancer.    Protect your skin.  Do not sunbathe or use tanning beds. Use sunscreen with a SPF 15 or higher. Apply sunscreen at least 15 minutes before you go outside. Reapply sunscreen every 2 hours. Wear protective clothing, hats, and sunglasses when you are outside.    Drive safely.  Always wear your seatbelt. Make sure everyone in your car wears a seatbelt. A seatbelt can save your life if you are in an accident. Do not use your cell phone when you are driving. This could distract you and cause an accident. Pull over if you need to make a call or send a text message.    Practice safe sex.  Use latex condoms if are sexually active and have more than one partner. Your healthcare provider may recommend screening tests for sexually transmitted infections (STIs).    Wear helmets, lifejackets, and protective gear.  Always wear a helmet when you ride a bike or motorcycle, go skiing, or play sports that could cause a head injury. Wear protective equipment when you play sports. Wear a lifejacket when you are on a boat or doing water sports.    © Copyright [employer] 2023 Information is for End User's use only and may not be sold, redistributed or  otherwise used for commercial purposes.  The above information is an  only. It is not intended as medical advice for individual conditions or treatments. Talk to your doctor, nurse or pharmacist before following any medical regimen to see if it is safe and effective for you.    Cholesterol and Your Health   AMBULATORY CARE:   Cholesterol  is a waxy, fat-like substance. Your body uses cholesterol to make hormones and new cells, and to protect nerves. Cholesterol is made by your body. It also comes from certain foods you eat, such as meat and dairy products. Your healthcare provider can help you set goals for your cholesterol levels. Your provider can help you create a plan to meet your goals.  Cholesterol level goals:  Your cholesterol level goals depend on your risk for heart disease, your age, and your other health conditions. The following are general guidelines:  Total cholesterol  includes low-density lipoprotein (LDL), high-density lipoprotein (HDL), and triglyceride levels. The total cholesterol level should be lower than 200 mg/dL and is best at about 150 mg/dL.    LDL cholesterol  is called bad cholesterol  because it forms plaque in your arteries. As plaque builds up, your arteries become narrow, and less blood flows through. When plaque decreases blood flow to your heart, you may have chest pain. If plaque completely blocks an artery that brings blood to your heart, you may have a heart attack. Plaque can break off and form blood clots. Blood clots may block arteries in your brain and cause a stroke. The level should be less than 130 mg/dL and is best at about 100 mg/dL.         HDL cholesterol  is called good cholesterol  because it helps remove LDL cholesterol from your arteries. It does this by attaching to LDL cholesterol and carrying it to your liver. Your liver breaks down LDL cholesterol so your body can get rid of it. High levels of HDL cholesterol can help prevent a heart attack and  stroke. Low levels of HDL cholesterol can increase your risk for heart disease, heart attack, and stroke. The level should be at least 40 mg/dL in males or at least 50 mg/dL in females.    Triglycerides  are a type of fat that store energy from foods you eat. High levels of triglycerides also cause plaque buildup. This can increase your risk for a heart attack or stroke. If your triglyceride level is high, your LDL cholesterol level may also be high. The level should be less than 150 mg/dL.    Any of the following can increase your risk for high cholesterol:   Smoking or drinking large amounts of alcohol    Having overweight or obesity, or not getting enough exercise    A medical condition such as hypertension (high blood pressure) or diabetes    A family history of high cholesterol    Age older than 65    What you need to know about having your cholesterol levels checked:  Adults 20 to 45 years of age should have their cholesterol levels checked every 4 to 6 years. Adults 45 years or older should have their cholesterol checked every 1 to 2 years. You may need your cholesterol checked more often, or at a younger age, if you have risk factors for heart disease. You may also need to have your cholesterol checked more often if you have other health conditions, such as diabetes. Blood tests are used to check cholesterol levels. Blood tests measure your levels of triglycerides, LDL cholesterol, and HDL cholesterol.  How healthy fats affect your cholesterol levels:  Healthy fats, also called unsaturated fats, help lower LDL cholesterol and triglyceride levels. Healthy fats include the following:  Monounsaturated fats  are found in foods such as olive oil, canola oil, avocado, nuts, and olives.    Polyunsaturated fats,  such as omega 3 fats, are found in fish, such as salmon, trout, and tuna. They can also be found in plant foods such as flaxseed, walnuts, and soybeans.    How unhealthy fats affect your cholesterol levels:   Unhealthy fats increase LDL cholesterol and triglyceride levels. They are found in foods high in cholesterol, saturated fat, and trans fat:  Cholesterol  is found in eggs, dairy, and meat.    Saturated fat  is found in butter, cheese, ice cream, whole milk, and coconut oil. Saturated fat is also found in meat, such as sausage, hot dogs, and bologna.    Trans fat  is found in liquid oils and is used in fried and baked foods. Foods that contain trans fats include chips, crackers, muffins, sweet rolls, microwave popcorn, and cookies.    Treatment  for high cholesterol will also decrease your risk of heart disease, heart attack, and stroke. Treatment may include any of the following:  Lifestyle changes  may include food, exercise, weight loss, and quitting smoking. You may also need to decrease the amount of alcohol you drink. Your healthcare provider will want you to start with lifestyle changes. Other treatment may be added if lifestyle changes are not enough. Your healthcare provider may recommend you work with a team to manage hyperlipidemia. The team may include medical experts such as a dietitian, an exercise or physical therapist, and a behavior therapist. Your family members may be included in helping you create lifestyle changes.    Medicines  may be given to lower your LDL cholesterol, triglyceride levels, or total cholesterol level. You may need medicines to lower your cholesterol if any of the following is true:    You have a history of stroke, TIA, unstable angina, or a heart attack.    Your LDL cholesterol level is 190 mg/dL or higher.    You are age 40 to 75 years, have diabetes or heart disease risk factors, and your LDL cholesterol is 70 mg/dL or higher.    Supplements  include fish oil, red yeast rice, and garlic. Fish oil may help lower your triglyceride and LDL cholesterol levels. It may also increase your HDL cholesterol level. Red yeast rice may help decrease your total cholesterol level and LDL  cholesterol level. Garlic may help lower your total cholesterol level. Do not take any supplements without talking to your healthcare provider.    Food changes you can make to lower your cholesterol levels:  A dietitian can help you create a healthy eating plan. Your dietitian can show you how to read food labels and choose foods low in saturated fat, trans fats, and cholesterol.     Decrease the total amount of fat you eat.  Choose lean meats, fat-free or 1% fat milk, and low-fat dairy products, such as yogurt and cheese. Try to limit or avoid red meats. Limit or do not eat fried foods or baked goods, such as cookies.    Replace unhealthy fats with healthy fats.  Cook foods in olive oil or canola oil. Choose soft margarines that are low in saturated fat and trans fat. Seeds, nuts, and avocados are other examples of healthy fats.    Eat foods with omega-3 fats.  Examples include salmon, tuna, mackerel, walnuts, and flaxseed. Eat fish 2 times per week. Pregnant women should not eat fish that have high levels of mercury, such as shark, swordfish, and lay mackerel.         Increase the amount of high-fiber foods you eat.  High-fiber foods can help lower your LDL cholesterol. Aim to get between 20 and 30 grams of fiber each day. Fruits and vegetables are high in fiber. Eat at least 5 servings each day. Other high-fiber foods are whole-grain or whole-wheat breads, pastas, or cereals, and brown rice. Eat 3 ounces of whole-grain foods each day. Increase fiber slowly. You may have abdominal discomfort, bloating, and gas if you add fiber to your diet too quickly.         Eat healthy protein foods.  Examples include low-fat dairy products, skinless chicken and turkey, fish, and nuts.    Limit foods and drinks that are high in sugar.  Your dietitian or healthcare provider can help you create daily limits for high-sugar foods and drinks. The limit may be lower if you have diabetes or another health condition. Limits can also  help you lose weight if needed.  Lifestyle changes you can make to lower your cholesterol levels:   Maintain a healthy weight.  Ask your healthcare provider what a healthy weight is for you. Ask your provider to help you create a weight loss plan if needed. Weight loss can decrease your total cholesterol and triglyceride levels. Weight loss may also help keep your blood pressure at a healthy level.    Be physically active throughout the day.  Physical activity, such as exercise, can help lower your total cholesterol level and maintain a healthy weight. Physical activity can also help increase your HDL cholesterol level. Work with your healthcare provider to create an program that is right for you. Get at least 30 to 40 minutes of moderate physical activity most days of the week. Examples of exercise include brisk walking, swimming, or biking. Also include strength training at least 2 times each week. Your healthcare providers can help you create a physical activity plan.            Do not smoke.  Nicotine and other chemicals in cigarettes and cigars can raise your cholesterol levels. Ask your healthcare provider for information if you currently smoke and need help to quit. E-cigarettes or smokeless tobacco still contain nicotine. Talk to your healthcare provider before you use these products.         Limit or do not drink alcohol.  Alcohol can increase your triglyceride levels. Ask your healthcare provider before you drink alcohol. Ask how much is okay for you to drink in 24 hours or 1 week.    Follow up with your doctor as directed:  Write down your questions so you remember to ask them during your visits.  © Copyright [employer] 2023 Information is for End User's use only and may not be sold, redistributed or otherwise used for commercial purposes.  The above information is an  only. It is not intended as medical advice for individual conditions or treatments. Talk to your doctor, nurse or pharmacist  before following any medical regimen to see if it is safe and effective for you.

## 2024-01-05 NOTE — PROGRESS NOTES
ADULT ANNUAL PHYSICAL  [facility] [address] [facility]    NAME: Jeanne Scott  AGE: 23 y.o. SEX: female  : 2000     DATE: 2024     Assessment and Plan:     Problem List Items Addressed This Visit        Other    Annual physical exam - Primary    Fatigue    Relevant Orders    Comprehensive metabolic panel    Generalized anxiety disorder    Relevant Medications    escitalopram (LEXAPRO) 5 mg tablet   Other Visit Diagnoses     Screening for thyroid disorder        Relevant Orders    TSH, 3rd generation with Free T4 reflex    Screening for deficiency anemia        Relevant Orders    CBC and differential            Immunizations and preventive care screenings were discussed with patient today. Appropriate education was printed on patient's after visit summary.    Counseling:  Dental Health: discussed importance of regular tooth brushing, flossing, and dental visits.  Injury prevention: discussed safety/seat belts, safety helmets, smoke detectors, carbon dioxide detectors, and smoking near bedding or upholstery.  Exercise: the importance of regular exercise/physical activity was discussed. Recommend exercise 3-5 times per week for at least 30 minutes.       Depression Screening and Follow-up Plan: Patient was screened for depression during today's encounter. They screened negative with a PHQ-2 score of 0.        Return in about 2 weeks (around 2024) for Recheck.     Chief Complaint:     Chief Complaint   Patient presents with   • Physical Exam      History of Present Illness:     Adult Annual Physical   Patient here for a comprehensive physical exam. The patient reports problems - anxiety .    Diet and Physical Activity  Diet/Nutrition: well balanced diet.   Exercise: no formal exercise.      Depression Screening  PHQ-2/9 Depression Screening    Little interest or pleasure in doing things: 0 - not at all  Feeling down, depressed, or  hopeless: 0 - not at all  PHQ-2 Score: 0  PHQ-2 Interpretation: Negative depression screen       General Health  Sleep: sleeps poorly.   Hearing: normal - bilateral.  Vision: most recent eye exam >1 year ago and wears glasses.   Dental: regular dental visits.       /GYN Health  Follows with gynecology? yes   Last menstrual period: 12/10/2023  Contraceptive method: oral contraceptives.  History of STDs?: no.     Advanced Care Planning  Do you have an advanced directive? no  Do you have a durable medical power of ? no     Review of Systems:     Review of Systems   Constitutional:  Negative for appetite change, fatigue, fever and unexpected weight change.   HENT:  Negative for dental problem, ear pain, hearing loss, nosebleeds, rhinorrhea, tinnitus, trouble swallowing and voice change.    Eyes:  Negative for photophobia, pain, discharge and visual disturbance.   Respiratory:  Negative for cough, chest tightness, shortness of breath and wheezing.    Cardiovascular:  Negative for chest pain and palpitations.   Gastrointestinal:  Negative for abdominal pain, blood in stool, constipation, diarrhea, nausea and vomiting.   Endocrine: Negative for cold intolerance, polydipsia, polyphagia and polyuria.   Genitourinary:  Negative for decreased urine volume, difficulty urinating, dysuria, enuresis, frequency, hematuria and urgency.   Musculoskeletal:  Negative for arthralgias, back pain, gait problem, joint swelling, myalgias, neck pain and neck stiffness.   Skin:  Negative for color change and rash.   Allergic/Immunologic: Negative for environmental allergies, food allergies and immunocompromised state.   Neurological:  Negative for dizziness, seizures, speech difficulty, light-headedness and headaches.   Hematological:  Negative for adenopathy. Does not bruise/bleed easily.   Psychiatric/Behavioral:  Positive for sleep disturbance. Negative for behavioral problems, confusion, decreased concentration, dysphoric mood,  self-injury and suicidal ideas. The patient is nervous/anxious. The patient is not hyperactive.       Past Medical History:     Past Medical History:   Diagnosis Date   • Urinary tract infection       Past Surgical History:     Past Surgical History:   Procedure Laterality Date   • NO PAST SURGERIES        Social History:     Social History     Socioeconomic History   • Marital status: Single     Spouse name: None   • Number of children: None   • Years of education: None   • Highest education level: Bachelor's degree (e.g., BA, AB, BS)   Occupational History   • None   Tobacco Use   • Smoking status: Never   • Smokeless tobacco: Never   Vaping Use   • Vaping status: Never Used   Substance and Sexual Activity   • Alcohol use: No     Comment: socially   • Drug use: No   • Sexual activity: Yes     Partners: Male     Birth control/protection: OCP   Other Topics Concern   • None   Social History Narrative        What type of home do you live in: Single house    Age of your home: 19    Type of heat: Oil    What type of fatoumata is in your bedroom: Carpet    Air products: Central air    Pets: Dog    Symptoms caused by pets: no    Are pets allowed in bedroom: Yes    Basement: None    Mold:no    Mattress covered by allergy cover: no    Pillows covered by allergy cover: no    Linens and bedding washed in hot water: yes    Exposure to second hand smoke: No        Habits:    Caffeine:    Chocolate:     Other:Lives with parents    Seeing a dentist     Social Determinants of Health     Financial Resource Strain: Not on file   Food Insecurity: Not on file   Transportation Needs: Not on file   Physical Activity: Not on file   Stress: Not on file   Social Connections: Not on file   Intimate Partner Violence: Not on file   Housing Stability: Not on file      Family History:     Family History   Problem Relation Age of Onset   • Arthritis Mother    • Hypertension Father         benign essential   • Breast cancer Neg Hx    • Colon  "cancer Neg Hx    • Ovarian cancer Neg Hx    • Uterine cancer Neg Hx    • Cervical cancer Neg Hx       Current Medications:     Current Outpatient Medications   Medication Sig Dispense Refill   • escitalopram (LEXAPRO) 5 mg tablet Take 1 tablet (5 mg total) by mouth daily 30 tablet 5   • fluticasone (FLONASE) 50 mcg/act nasal spray 1 spray into each nostril 2 (two) times a day     • norethindrone-ethinyl estradiol ([facility] 1/20) 1-20 MG-MCG per tablet TAKE 1 TABLET DAILY 84 tablet 0     No current facility-administered medications for this visit.      Allergies:     No Known Allergies   Physical Exam:     /78 (BP Location: Left arm, Patient Position: Sitting, Cuff Size: Standard)   Pulse 79   Temp 98.1 °F (36.7 °C) (Tympanic)   Ht 5' 8\" (1.727 m)   Wt 74.4 kg (164 lb)   SpO2 98%   BMI 24.94 kg/m²     Physical Exam  Vitals and nursing note reviewed.   Constitutional:       Appearance: Normal appearance. She is normal weight.   HENT:      Head: Normocephalic and atraumatic.      Right Ear: Tympanic membrane, ear canal and external ear normal.      Left Ear: Tympanic membrane, ear canal and external ear normal.   Eyes:      Extraocular Movements: Extraocular movements intact.      Conjunctiva/sclera: Conjunctivae normal.   Neck:      Thyroid: No thyromegaly.      Vascular: No carotid bruit.   Cardiovascular:      Rate and Rhythm: Normal rate and regular rhythm.      Pulses: Normal pulses.      Heart sounds: Normal heart sounds.   Pulmonary:      Effort: Pulmonary effort is normal.      Breath sounds: Normal breath sounds.   Abdominal:      General: Abdomen is flat. Bowel sounds are normal.      Palpations: Abdomen is soft. There is no hepatomegaly, splenomegaly or mass.      Tenderness: There is no abdominal tenderness. There is no right CVA tenderness or left CVA tenderness.   Musculoskeletal:         General: Normal range of motion.      Cervical back: Normal range of motion and neck supple.      Right " lower leg: No edema.      Left lower leg: No edema.   Lymphadenopathy:      Cervical: No cervical adenopathy.   Skin:     General: Skin is warm and dry.   Neurological:      General: No focal deficit present.      Mental Status: She is alert and oriented to person, place, and time.   Psychiatric:         Attention and Perception: Attention normal.         Mood and Affect: Affect normal. Mood is anxious.         Speech: Speech normal.         Behavior: Behavior normal. Behavior is cooperative.         Thought Content: Thought content normal.         Judgment: Judgment normal.          Sapphire Holden PA-C   [facility] [address] [facility]

## 2024-01-10 ENCOUNTER — APPOINTMENT (OUTPATIENT)
Dept: LAB | Facility: HOSPITAL | Age: 24
End: 2024-01-10
Payer: COMMERCIAL

## 2024-01-10 ENCOUNTER — TELEPHONE (OUTPATIENT)
Dept: FAMILY MEDICINE CLINIC | Facility: CLINIC | Age: 24
End: 2024-01-10

## 2024-01-10 DIAGNOSIS — R53.83 FATIGUE, UNSPECIFIED TYPE: ICD-10-CM

## 2024-01-10 DIAGNOSIS — Z13.0 SCREENING FOR DEFICIENCY ANEMIA: ICD-10-CM

## 2024-01-10 DIAGNOSIS — Z13.29 SCREENING FOR THYROID DISORDER: ICD-10-CM

## 2024-01-10 LAB
ALBUMIN SERPL BCP-MCNC: 4.4 G/DL (ref 3.5–5)
ALP SERPL-CCNC: 54 U/L (ref 34–104)
ALT SERPL W P-5'-P-CCNC: 12 U/L (ref 7–52)
ANION GAP SERPL CALCULATED.3IONS-SCNC: 5 MMOL/L
AST SERPL W P-5'-P-CCNC: 12 U/L (ref 13–39)
BASOPHILS # BLD AUTO: 0.07 THOUSANDS/ÂΜL (ref 0–0.1)
BASOPHILS NFR BLD AUTO: 1 % (ref 0–1)
BILIRUB SERPL-MCNC: 0.39 MG/DL (ref 0.2–1)
BUN SERPL-MCNC: 10 MG/DL (ref 5–25)
CALCIUM SERPL-MCNC: 9 MG/DL (ref 8.4–10.2)
CHLORIDE SERPL-SCNC: 105 MMOL/L (ref 96–108)
CO2 SERPL-SCNC: 28 MMOL/L (ref 21–32)
CREAT SERPL-MCNC: 0.81 MG/DL (ref 0.6–1.3)
EOSINOPHIL # BLD AUTO: 0.47 THOUSAND/ÂΜL (ref 0–0.61)
EOSINOPHIL NFR BLD AUTO: 7 % (ref 0–6)
ERYTHROCYTE [DISTWIDTH] IN BLOOD BY AUTOMATED COUNT: 11.8 % (ref 11.6–15.1)
GFR SERPL CREATININE-BSD FRML MDRD: 101 ML/MIN/1.73SQ M
GLUCOSE P FAST SERPL-MCNC: 94 MG/DL (ref 65–99)
HCT VFR BLD AUTO: 40.8 % (ref 34.8–46.1)
HGB BLD-MCNC: 14 G/DL (ref 11.5–15.4)
IMM GRANULOCYTES # BLD AUTO: 0.01 THOUSAND/UL (ref 0–0.2)
IMM GRANULOCYTES NFR BLD AUTO: 0 % (ref 0–2)
LYMPHOCYTES # BLD AUTO: 2.67 THOUSANDS/ÂΜL (ref 0.6–4.47)
LYMPHOCYTES NFR BLD AUTO: 40 % (ref 14–44)
MCH RBC QN AUTO: 29 PG (ref 26.8–34.3)
MCHC RBC AUTO-ENTMCNC: 34.3 G/DL (ref 31.4–37.4)
MCV RBC AUTO: 85 FL (ref 82–98)
MONOCYTES # BLD AUTO: 0.49 THOUSAND/ÂΜL (ref 0.17–1.22)
MONOCYTES NFR BLD AUTO: 7 % (ref 4–12)
NEUTROPHILS # BLD AUTO: 3.04 THOUSANDS/ÂΜL (ref 1.85–7.62)
NEUTS SEG NFR BLD AUTO: 45 % (ref 43–75)
NRBC BLD AUTO-RTO: 0 /100 WBCS
PLATELET # BLD AUTO: 349 THOUSANDS/UL (ref 149–390)
PMV BLD AUTO: 8.4 FL (ref 8.9–12.7)
POTASSIUM SERPL-SCNC: 4.2 MMOL/L (ref 3.5–5.3)
PROT SERPL-MCNC: 7.5 G/DL (ref 6.4–8.4)
RBC # BLD AUTO: 4.82 MILLION/UL (ref 3.81–5.12)
SODIUM SERPL-SCNC: 138 MMOL/L (ref 135–147)
TSH SERPL DL<=0.05 MIU/L-ACNC: 3.57 UIU/ML (ref 0.45–4.5)
WBC # BLD AUTO: 6.75 THOUSAND/UL (ref 4.31–10.16)

## 2024-01-10 PROCEDURE — 85025 COMPLETE CBC W/AUTO DIFF WBC: CPT

## 2024-01-10 PROCEDURE — 36415 COLL VENOUS BLD VENIPUNCTURE: CPT

## 2024-01-10 PROCEDURE — 84443 ASSAY THYROID STIM HORMONE: CPT

## 2024-01-10 PROCEDURE — 80053 COMPREHEN METABOLIC PANEL: CPT

## 2024-01-10 NOTE — TELEPHONE ENCOUNTER
"Pt rcb - gave her Cecilia's advisements/ bw test results.  \"Please let patient know labs are normal. \" Which patient verbally understood and has no further questions.    "

## 2024-01-18 ENCOUNTER — OFFICE VISIT (OUTPATIENT)
Dept: FAMILY MEDICINE CLINIC | Facility: CLINIC | Age: 24
End: 2024-01-18
Payer: COMMERCIAL

## 2024-01-18 VITALS
OXYGEN SATURATION: 99 % | HEIGHT: 68 IN | BODY MASS INDEX: 24.55 KG/M2 | SYSTOLIC BLOOD PRESSURE: 120 MMHG | DIASTOLIC BLOOD PRESSURE: 86 MMHG | HEART RATE: 86 BPM | TEMPERATURE: 97.6 F | WEIGHT: 162 LBS

## 2024-01-18 DIAGNOSIS — F41.1 GENERALIZED ANXIETY DISORDER: Primary | ICD-10-CM

## 2024-01-18 PROCEDURE — 99213 OFFICE O/P EST LOW 20 MIN: CPT | Performed by: PHYSICIAN ASSISTANT

## 2024-01-18 NOTE — PATIENT INSTRUCTIONS
Is to continue at this dose for 2 more weeks. If no improvement aat all then increase to Lexapro 10 mg.

## 2024-01-18 NOTE — PROGRESS NOTES
Assessment/Plan:          Diagnoses and all orders for this visit:    Generalized anxiety disorder  Comments:  Continue 5 mg of Lexapro for the next 2 weeks.  If she is not noticing any improvement at all then we will increase to 10 mg.            Subjective:      Patient ID: Jeanne Scott is a 23 y.o. female.    Patient is here for follow-up of her anxiety and starting Lexapro 5 mg.  She has been taking the Lexapro for the past 2 weeks and has not noticed any change at all.  She had an episode of very increased anxiety over the weekend.  She does notice she is sleeping a little bit better.        The following portions of the patient's history were reviewed and updated as appropriate:   She has a past medical history of Urinary tract infection.,  does not have any pertinent problems on file.,   has a past surgical history that includes No past surgeries.,  family history includes Arthritis in her mother; Hypertension in her father.,   reports that she has never smoked. She has never used smokeless tobacco. She reports that she does not drink alcohol and does not use drugs.,  has No Known Allergies..  Current Outpatient Medications   Medication Sig Dispense Refill   • fluticasone (FLONASE) 50 mcg/act nasal spray 1 spray into each nostril 2 (two) times a day     • norethindrone-ethinyl estradiol (Debbi  1/20) 1-20 MG-MCG per tablet TAKE 1 TABLET DAILY 84 tablet 0   • escitalopram (LEXAPRO) 5 mg tablet Take 1 tablet (5 mg total) by mouth daily 30 tablet 5     No current facility-administered medications for this visit.       Review of Systems   Constitutional:  Negative for fatigue.   Gastrointestinal:  Negative for abdominal pain, constipation, diarrhea and nausea.   Neurological:  Negative for dizziness, light-headedness and headaches.   Psychiatric/Behavioral:  Positive for decreased concentration. Negative for dysphoric mood, self-injury and suicidal ideas. The patient is nervous/anxious.       "    Objective:  Vitals:    01/18/24 0855   BP: 120/86   BP Location: Left arm   Patient Position: Sitting   Cuff Size: Standard   Pulse: 86   Temp: 97.6 °F (36.4 °C)   TempSrc: Tympanic   SpO2: 99%   Weight: 73.5 kg (162 lb)   Height: 5' 8\" (1.727 m)     Body mass index is 24.63 kg/m².     Physical Exam  Constitutional:       Appearance: Normal appearance.   HENT:      Head: Normocephalic.   Cardiovascular:      Rate and Rhythm: Normal rate.   Pulmonary:      Effort: Pulmonary effort is normal.   Neurological:      Mental Status: She is alert and oriented to person, place, and time.   Psychiatric:         Attention and Perception: Attention normal.         Mood and Affect: Affect normal. Mood is anxious.         Speech: Speech normal.         Behavior: Behavior is cooperative.         Thought Content: Thought content normal.           "

## 2024-02-04 DIAGNOSIS — F41.1 GENERALIZED ANXIETY DISORDER: ICD-10-CM

## 2024-02-04 RX ORDER — ESCITALOPRAM OXALATE 10 MG/1
10 TABLET ORAL DAILY
Qty: 30 TABLET | Refills: 0 | Status: SHIPPED | OUTPATIENT
Start: 2024-02-04

## 2024-02-19 ENCOUNTER — ANNUAL EXAM (OUTPATIENT)
Age: 24
End: 2024-02-19
Payer: COMMERCIAL

## 2024-02-19 VITALS
HEIGHT: 68 IN | BODY MASS INDEX: 25.04 KG/M2 | DIASTOLIC BLOOD PRESSURE: 70 MMHG | WEIGHT: 165.2 LBS | SYSTOLIC BLOOD PRESSURE: 100 MMHG

## 2024-02-19 DIAGNOSIS — Z78.9 USES ORAL CONTRACEPTIVES AS PRIMARY BIRTH CONTROL METHOD: ICD-10-CM

## 2024-02-19 DIAGNOSIS — Z01.419 ENCOUNTER FOR ANNUAL ROUTINE GYNECOLOGICAL EXAMINATION: Primary | ICD-10-CM

## 2024-02-19 PROCEDURE — 99395 PREV VISIT EST AGE 18-39: CPT

## 2024-02-19 RX ORDER — NORETHINDRONE ACETATE AND ETHINYL ESTRADIOL 1MG-20(21)
1 KIT ORAL DAILY
Qty: 84 TABLET | Refills: 4 | Status: SHIPPED | OUTPATIENT
Start: 2024-02-19 | End: 2024-02-19

## 2024-02-19 RX ORDER — NORETHINDRONE ACETATE AND ETHINYL ESTRADIOL 1MG-20(21)
1 KIT ORAL DAILY
Qty: 84 TABLET | Refills: 4 | Status: SHIPPED | OUTPATIENT
Start: 2024-02-19

## 2024-02-19 NOTE — PROGRESS NOTES
Assessment/Plan:    Encounter for annual routine gynecological examination  Last pap 2022, due 2025  Happy with current OCPs. Refills sent. ACHES reviewed.   Sexually active, declined STD screening, low risk.   Gardasil vaccine series recommended.   Breast self awareness encouraged.  F/u annually and PRN       Diagnoses and all orders for this visit:    Encounter for annual routine gynecological examination    Uses oral contraceptives as primary birth control method  -     norethindrone-ethinyl estradiol (Debbi SALGADO ) 1-20 MG-MCG per tablet; Take 1 tablet by mouth daily          Health Maintenance:    Last PAP: 2022. negative  Next PAP Due: 2025    Gardasil Vaccine Series: She has not had the Gardasil series.    Subjective    CC: Yearly Exam     Jeanne Scott is a 23 y.o. female  here for an annual exam.      Menarche: 13 Y/O    Patient's last menstrual period was 2024 (approximate).  Sexual activity: She is sexually active   Contraception: OCPs  STD testing:  She does not want STD testing today.   non smoker, social drinker    She has no questions or concerns for today's visit. Menstrual cycles are regular. She denies any issues with bleeding or her menses. She denies breast concerns, abnormal vaginal discharge, vaginal itching, odor, irritation, bowel/bladder dysfunction, urinary symptoms, pelvic pain, or dyspareunia today.     Family hx of breast cancer: denies  Family hx of ovarian cancer: denies  Family hx of colon cancer: denies     Past Medical History:   Diagnosis Date    Urinary tract infection      Past Surgical History:   Procedure Laterality Date    NO PAST SURGERIES         Immunization History   Administered Date(s) Administered    COVID-19 PFIZER VACCINE 0.3 ML IM 2021, 2021, 2021    COVID-19 Pfizer mRNA vacc PF talya-sucrose 12 yr and older (Comirnaty) 10/17/2023    DTaP 5 1999, 2000, 2000, 2001, 2005    Hepatitis A,  "Pediatric 2000, 2000, 2001    IPV 1999, 2000, 2001, 2005    MMR 2001, 2005    Meningococcal, Unknown Serogroups 2010, 2017    Tdap 2010    Varicella 2000, 2008       Family History   Problem Relation Age of Onset    Arthritis Mother     Hypertension Father         benign essential    Breast cancer Neg Hx     Colon cancer Neg Hx     Ovarian cancer Neg Hx     Uterine cancer Neg Hx     Cervical cancer Neg Hx      Social History     Tobacco Use    Smoking status: Never    Smokeless tobacco: Never   Vaping Use    Vaping status: Never Used   Substance Use Topics    Alcohol use: Yes     Comment: socially    Drug use: No       Current Outpatient Medications:     escitalopram (LEXAPRO) 10 mg tablet, Take 1 tablet (10 mg total) by mouth daily, Disp: 30 tablet, Rfl: 0    fluticasone (FLONASE) 50 mcg/act nasal spray, 1 spray into each nostril 2 (two) times a day, Disp: , Rfl:     norethindrone-ethinyl estradiol ([facility] ) 1-20 MG-MCG per tablet, Take 1 tablet by mouth daily, Disp: 84 tablet, Rfl: 4  Patient Active Problem List    Diagnosis Date Noted    Encounter for annual routine gynecological examination 2024    Generalized anxiety disorder 2024    Fatigue 2022    Annual physical exam 2022    Uses oral contraceptives as primary birth control method 2022    Allergic rhinitis 2022    History of frequent urinary tract infections 2019    Disturbance, sleep 2018    Vaginal discomfort 2018    Acne 2016       No Known Allergies    OB History    Para Term  AB Living   0 0 0 0 0 0   SAB IAB Ectopic Multiple Live Births   0 0 0 0 0       Vitals:    24 0724   BP: 100/70   BP Location: Left arm   Patient Position: Sitting   Weight: 74.9 kg (165 lb 3.2 oz)   Height: 5' 8\" (1.727 m)     Body mass index is 25.12 kg/m².    Review of Systems   Constitutional:  Negative for " chills and fever.   Gastrointestinal:  Negative for abdominal pain, constipation, diarrhea, nausea and vomiting.   Genitourinary:  Negative for difficulty urinating, dyspareunia, dysuria, frequency, menstrual problem, pelvic pain, urgency, vaginal bleeding, vaginal discharge and vaginal pain.   Musculoskeletal:  Negative for back pain and myalgias.   Skin:  Negative for pallor and rash.   Neurological:  Negative for headaches.   Psychiatric/Behavioral:  Negative for dysphoric mood.         Physical Exam  Constitutional:       General: She is not in acute distress.     Appearance: Normal appearance. She is not ill-appearing.   Genitourinary:      Urethral meatus normal.      No lesions in the vagina.      Right Labia: No rash, tenderness, lesions, skin changes or Bartholin's cyst.     Left Labia: No tenderness, lesions, skin changes, Bartholin's cyst or rash.     No inguinal adenopathy present in the right or left side.     No vaginal discharge, erythema, tenderness, bleeding or ulceration.        Right Adnexa: not tender, not full and no mass present.     Left Adnexa: not tender, not full and no mass present.     Cervix is nulliparous.      No cervical motion tenderness, discharge, friability, lesion, polyp or nabothian cyst.      Uterus is not enlarged, fixed or tender.      No uterine mass detected.     No urethral prolapse, tenderness or discharge present.      Bladder is not tender and urgency on palpation not present.       Pelvic exam was performed with patient in the lithotomy position.   Breasts:     Right: No swelling, inverted nipple, mass, nipple discharge, skin change or tenderness.      Left: No swelling, inverted nipple, mass, nipple discharge, skin change or tenderness.   HENT:      Head: Normocephalic and atraumatic.   Eyes:      Conjunctiva/sclera: Conjunctivae normal.   Pulmonary:      Effort: Pulmonary effort is normal.   Abdominal:      General: There is no distension.      Palpations: Abdomen is  soft.      Tenderness: There is no abdominal tenderness.   Musculoskeletal:         General: Normal range of motion.      Cervical back: Neck supple.   Lymphadenopathy:      Upper Body:      Right upper body: No supraclavicular or axillary adenopathy.      Left upper body: No supraclavicular or axillary adenopathy.      Lower Body: No right inguinal adenopathy. No left inguinal adenopathy.   Neurological:      Mental Status: She is alert and oriented to person, place, and time.   Skin:     General: Skin is warm and dry.   Psychiatric:         Mood and Affect: Mood normal.         Behavior: Behavior normal.         Thought Content: Thought content normal.         Judgment: Judgment normal.   Vitals and nursing note reviewed.

## 2024-02-19 NOTE — ASSESSMENT & PLAN NOTE
Last pap 11/2022, due 11/2025  Happy with current OCPs. Refills sent. ACHES reviewed.   Sexually active, declined STD screening, low risk.   Gardasil vaccine series recommended.   Breast self awareness encouraged.  F/u annually and PRN

## 2024-02-19 NOTE — PATIENT INSTRUCTIONS
Birth Control Pills   WHAT YOU NEED TO KNOW:   What are birth control pills?  Birth control pills are also called oral contraceptives, or the pill. It is medicine that helps prevent pregnancy by stopping ovulation. Ovulation is when the ovaries make and release an egg cell each month. If this egg gets fertilized by sperm, pregnancy occurs. You will need to take the pill at the same time every day. Your healthcare provider will tell you when to start taking the pill. You will also be told what to do if you miss a dose. Instructions will depend on the kind of birth control pills you are taking.   What are the different kinds of birth control pills?  Some kinds are taken for 21 days in a row, followed by 7 days of placebo (no hormones) pills. Other kinds are taken for 24 days followed by 4 days of placebos. Each kind has a certain amount of female hormones. Your provider will decide on the kind that is best for you based on your age and other health conditions.  What may be done before I can start taking birth control pills?  You need to see your healthcare provider to get a prescription. Any of the following may be done before your healthcare provider gives you a prescription:  Your healthcare provider will ask about diseases and illnesses you have had in the past. Your provider will check your risk for blood clots, heart conditions, or stroke. Tell your provider if you had gastric bypass surgery. This surgery can affect the way your body absorbs medicines such as birth control pills.    Your provider will also check your blood pressure, and may do a breast and pelvic exam. A Pap smear may also be done during the pelvic exam. This is a test to make sure you do not have abnormal changes on your cervix. You may need other tests, such as a urine test to make sure you are not pregnant.    Your provider will ask if you take any medicines and if you smoke. Smoking increases your risk for stroke, heart attack, or a blood  clot in your lungs. If you smoke, you should not take certain kinds of birth control pills.    What are the advantages of birth control pills?  When birth control pills are used correctly, the chances of getting pregnant are very low. Birth control pills may help decrease bleeding and pain during your monthly period. They may also help prevent cancer of the uterus and ovaries.  What are the disadvantages of birth control pills?  You may have sudden changes in your mood or feelings while you take birth control pills. You may have nausea and a decreased sex drive. You may have an increased appetite and rapid weight gain. You may also have bleeding in between periods, less frequent periods, vaginal dryness, and breast pain. Birth control pills will not protect you from sexually transmitted infections. Rarely, some birth control pills can increase your risk for a blood clot. This may become life-threatening.  What should I do if I decide I want to get pregnant?  If you are planning to have a baby, ask your healthcare provider when you may stop taking your birth control pills. It may take some time for you to start ovulating again. Ask your healthcare provider for more information about pregnancy after birth control pills.  When should I start taking birth control pills after I have a baby?  If you are not breastfeeding, you may start taking birth control pills 3 weeks after you give birth. You may be able to take certain types of birth control pills if you are breastfeeding. These pills can be started from 6 weeks to 6 months after you give birth. Ask your healthcare provider for more information about when to start taking birth control pills after you give birth.  What do I need to know about birth control pills and menopause?   Talk with your healthcare provider if you want to take birth control pills around menopause.     Around age 45, you will enter into perimenopause. This means your hormone levels are dropping  and you are ovulating less often. You can still become pregnant during this time. The risk for problems, such as miscarriage, are higher if you become pregnant after age 45. Birth control pills will prevent pregnancy, and may also help prevent or relieve some signs and symptoms of menopause. Examples are hot flashes and mood swings.     Your provider will do tests when you are around age 50. The tests may show that you are in menopause. If the tests do not show menopause for sure, you may be able to continue taking the pill up to age 55. The decision will depend on your health and if you have any medical conditions, such as a blood clot.    Call your local emergency number (911 in the [place]) for any of the following:   You have any of the following signs of a stroke:      Numbness or drooping on one side of your face     Weakness in an arm or leg    Confusion or difficulty speaking    Dizziness, a severe headache, or vision loss    You feel lightheaded, short of breath, and have chest pain.    You cough up blood.    When should I seek immediate care?   Your arm or leg feels warm, tender, and painful. It may look swollen and red.    You have severe pain, numbness, or swelling in your arms or legs.    When should I call my doctor?   You have forgotten to take a birth control pill.    You have mood changes, such as depression, since starting birth control pills.    You have nausea or are vomiting.    You have severe abdominal pain.    You missed a period and have questions or concerns about being pregnant.    You still have bleeding 4 months after taking birth control pills correctly.    You have questions or concerns about your condition or care.    CARE AGREEMENT:   You have the right to help plan your care. Learn about your health condition and how it may be treated. Discuss treatment options with your healthcare providers to decide what care you want to receive. You always have the right to refuse treatment. The above  information is an  only. It is not intended as medical advice for individual conditions or treatments. Talk to your doctor, nurse or pharmacist before following any medical regimen to see if it is safe and effective for you.  © Copyright [employer] 2023 Information is for End User's use only and may not be sold, redistributed or otherwise used for commercial purposes.  HPV (Human Papillomavirus)   AMBULATORY CARE:   Human Papillomavirus (HPV)  is the name for a group of viruses that can infect your skin or other parts of your body. HPV is the most common infection spread by sexual contact. It can also be spread from a mother to her baby during delivery.  Common symptoms include the following:   Painless warts in your mouth or on your genitals    Genital or anal discharge, bleeding, itching, or pain    Pain when you urinate    Call your doctor if:   You have new or worsening symptoms.    You have questions or concerns about your condition or care.    HPV diagnosis:  Your healthcare provider may use a vinegar liquid to help diagnose HPV genital warts. Women 30 to 65 years old can be checked for HPV during regular cervical cancer screenings. An HPV test checks for certain types of HPV that can cause changes in cervical cells. Without treatment, the changed cells can become cancer. An HPV test can be done every 5 years if the results show no infection. The test can be done with or without a Pap smear. A Pap smear checks for cancer or for abnormal cells that can become cancer. You may be tested for HPV if you have mouth or throat cancer.  Treatment:  HPV cannot be cured, but an infection may go away on its own in about 2 years without causing problems. If the infection continues, some types of HPV can lead to health conditions that need to be treated. Examples include warts and certain cancers, especially squamous cell carcinoma (SCC). HPV-linked SCCs commonly develop in the anus, throat (called  oropharyngeal cancer), cervix, vagina, penis, or mouth. HPV can also cause a type of cervical cancer called adenocarcinoma. Symptoms of any of these conditions may not develop for several years after you were exposed to HPV. You will need to be monitored closely. Ask your healthcare provider for more information about monitoring, conditions caused by HPV, and available treatments.  Prevent an HPV infection:  HPV is usually spread through sexual activity. The following can help prevent infection:  Ask about the HPV vaccine.  The HPV vaccine is given to females and males, usually at 11 or 12 years of age. It can be given from 9 years through 45 years of age, if needed. It is most effective if given before sexual activity begins.         Use a new condom, contraceptive barrier, or dental dam each time you have sex.  This includes oral, vaginal, and anal sex. Talk to your healthcare provider if you have any questions about what to use or how to use it.    Follow up with your doctor as directed:  Write down your questions so you remember to ask them during your visits.  © Copyright [employer] 2023 Information is for End User's use only and may not be sold, redistributed or otherwise used for commercial purposes.  The above information is an  only. It is not intended as medical advice for individual conditions or treatments. Talk to your doctor, nurse or pharmacist before following any medical regimen to see if it is safe and effective for you.

## 2024-02-21 PROBLEM — Z01.419 ENCOUNTER FOR ANNUAL ROUTINE GYNECOLOGICAL EXAMINATION: Status: RESOLVED | Noted: 2024-02-19 | Resolved: 2024-02-21

## 2024-03-05 DIAGNOSIS — F41.1 GENERALIZED ANXIETY DISORDER: ICD-10-CM

## 2024-03-05 RX ORDER — ESCITALOPRAM OXALATE 10 MG/1
10 TABLET ORAL DAILY
Qty: 90 TABLET | Refills: 1 | Status: SHIPPED | OUTPATIENT
Start: 2024-03-05 | End: 2024-03-06 | Stop reason: SDUPTHER

## 2024-03-06 DIAGNOSIS — F41.1 GENERALIZED ANXIETY DISORDER: ICD-10-CM

## 2024-03-06 RX ORDER — ESCITALOPRAM OXALATE 10 MG/1
10 TABLET ORAL DAILY
Qty: 30 TABLET | Refills: 0 | Status: SHIPPED | OUTPATIENT
Start: 2024-03-06

## 2024-04-19 ENCOUNTER — OFFICE VISIT (OUTPATIENT)
Dept: PODIATRY | Facility: CLINIC | Age: 24
End: 2024-04-19

## 2024-04-19 VITALS
HEART RATE: 78 BPM | BODY MASS INDEX: 25.01 KG/M2 | HEIGHT: 68 IN | SYSTOLIC BLOOD PRESSURE: 117 MMHG | DIASTOLIC BLOOD PRESSURE: 80 MMHG | WEIGHT: 165 LBS | OXYGEN SATURATION: 97 %

## 2024-04-19 DIAGNOSIS — B35.1 ONYCHOMYCOSIS: Primary | ICD-10-CM

## 2024-04-19 DIAGNOSIS — B35.3 TINEA PEDIS OF LEFT FOOT: ICD-10-CM

## 2024-04-19 PROCEDURE — 99203 OFFICE O/P NEW LOW 30 MIN: CPT | Performed by: PODIATRIST

## 2024-04-19 PROCEDURE — 87220 TISSUE EXAM FOR FUNGI: CPT | Performed by: PODIATRIST

## 2024-04-19 PROCEDURE — 87102 FUNGUS ISOLATION CULTURE: CPT | Performed by: PODIATRIST

## 2024-04-19 RX ORDER — KETOCONAZOLE 20 MG/G
CREAM TOPICAL DAILY
Qty: 60 G | Refills: 1 | Status: SHIPPED | OUTPATIENT
Start: 2024-04-19

## 2024-04-19 NOTE — PROGRESS NOTES
Assessment/Plan:     The patient's clinical examination today is significant for thickening and discoloration of the left great toenail.  A fake nail is also overlying the remnant of the left hallux nail plate.  The nail is loose and was debrided away revealing the remnant of the left hallux nail.  There is minimal tenderness to palpation.  There is no active infection noted.    A portion of the left hallux nail plate was obtained for pathologic testing.  If this is fungal in nature, the patient would be potentially interested in starting oral antifungal therapy.  The patient does have a known history of trauma to the great toenails several years ago secondary to irritation in ski boots.    I will start her on ketoconazole 2% cream to be applied to the affected nail and nailbed for now.  Recommend follow-up in 6 weeks, can follow-up on nail pathology results.     Diagnoses and all orders for this visit:    Onychomycosis  -     ketoconazole (NIZORAL) 2 % cream; Apply topically daily    Tinea pedis of left foot  -     ketoconazole (NIZORAL) 2 % cream; Apply topically daily          Subjective:     Patient ID: Jeanne Scott is a 23 y.o. female.    Patient presents today for her initial consultation with [facility]'s podiatry with a chief complaint of a painful left great toe.  The patient states that she applied a fingernail on the digit due to the short nature of the nail plate.  She is not having a hard time getting nail off and is causing discomfort.  She does note a history of prior trauma to both of her great toenails several years ago while skiing.  Since that time the nails have grown back short and thick.        PAST MEDICAL HISTORY:  Past Medical History:   Diagnosis Date    Urinary tract infection        PAST SURGICAL HISTORY:  Past Surgical History:   Procedure Laterality Date    NO PAST SURGERIES          ALLERGIES:  Patient has no known allergies.    MEDICATIONS:  Current Outpatient Medications    Medication Sig Dispense Refill    escitalopram (LEXAPRO) 10 mg tablet Take 1 tablet (10 mg total) by mouth daily 30 tablet 0    fluticasone (FLONASE) 50 mcg/act nasal spray 1 spray into each nostril 2 (two) times a day      ketoconazole (NIZORAL) 2 % cream Apply topically daily 60 g 1    norethindrone-ethinyl estradiol (Debbi  1/20) 1-20 MG-MCG per tablet Take 1 tablet by mouth daily 84 tablet 4     No current facility-administered medications for this visit.       SOCIAL HISTORY:  Social History     Socioeconomic History    Marital status: Single     Spouse name: None    Number of children: None    Years of education: None    Highest education level: Bachelor's degree (e.g., BA, AB, BS)   Occupational History    None   Tobacco Use    Smoking status: Never    Smokeless tobacco: Never   Vaping Use    Vaping status: Never Used   Substance and Sexual Activity    Alcohol use: Yes     Comment: socially    Drug use: No    Sexual activity: Yes     Partners: Male     Birth control/protection: OCP   Other Topics Concern    None   Social History Narrative        What type of home do you live in: Single house    Age of your home: 19    Type of heat: Oil    What type of fatoumata is in your bedroom: Carpet    Air products: Central air    Pets: Dog    Symptoms caused by pets: no    Are pets allowed in bedroom: Yes    Basement: None    Mold:no    Mattress covered by allergy cover: no    Pillows covered by allergy cover: no    Linens and bedding washed in hot water: yes    Exposure to second hand smoke: No        Habits:    Caffeine:    Chocolate:     Other:Lives with parents    Seeing a dentist     Social Determinants of Health     Financial Resource Strain: Not on file   Food Insecurity: Not on file   Transportation Needs: Not on file   Physical Activity: Not on file   Stress: Not on file   Social Connections: Not on file   Intimate Partner Violence: Not on file   Housing Stability: Not on file        Review of Systems    Constitutional: Negative.    HENT: Negative.     Eyes: Negative.    Respiratory: Negative.     Cardiovascular: Negative.    Endocrine: Negative.    Musculoskeletal: Negative.    Neurological: Negative.    Hematological: Negative.    Psychiatric/Behavioral: Negative.           Objective:     Physical Exam  Constitutional:       Appearance: Normal appearance.   HENT:      Head: Normocephalic and atraumatic.      Nose: Nose normal.   Cardiovascular:      Pulses:           Dorsalis pedis pulses are 2+ on the right side and 2+ on the left side.        Posterior tibial pulses are 2+ on the right side and 2+ on the left side.   Pulmonary:      Effort: Pulmonary effort is normal.   Feet:      Right foot:      Toenail Condition: Right toenails are abnormally thick.      Left foot:      Toenail Condition: Left toenails are abnormally thick. Fungal disease present.     Comments: The patient's clinical examination today is significant for thickening and discoloration of the left great toenail.  A fake nail is also overlying the remnant of the left hallux nail plate.  The nail is loose and was debrided away revealing the remnant of the left hallux nail.  There is minimal tenderness to palpation.  There is no active infection noted.    Skin:     General: Skin is warm.      Capillary Refill: Capillary refill takes less than 2 seconds.   Neurological:      General: No focal deficit present.      Mental Status: She is alert and oriented to person, place, and time.   Psychiatric:         Mood and Affect: Mood normal.         Behavior: Behavior normal.         Thought Content: Thought content normal.

## 2024-04-22 LAB
FUNGUS SPEC CULT: NORMAL
KOH PREP SPEC: NORMAL

## 2024-04-26 DIAGNOSIS — G47.9 DISTURBANCE, SLEEP: ICD-10-CM

## 2024-04-29 RX ORDER — TRAZODONE HYDROCHLORIDE 50 MG/1
50 TABLET ORAL
Qty: 30 TABLET | Refills: 1 | Status: SHIPPED | OUTPATIENT
Start: 2024-04-29

## 2024-05-06 LAB — FUNGUS SPEC CULT: NORMAL

## 2024-05-13 LAB — FUNGUS SPEC CULT: NORMAL

## 2024-05-20 LAB — FUNGUS SPEC CULT: NORMAL

## 2024-07-24 DIAGNOSIS — F41.1 GENERALIZED ANXIETY DISORDER: ICD-10-CM

## 2024-07-24 RX ORDER — ESCITALOPRAM OXALATE 10 MG/1
10 TABLET ORAL DAILY
Qty: 30 TABLET | Refills: 0 | Status: SHIPPED | OUTPATIENT
Start: 2024-07-24

## 2024-07-24 NOTE — TELEPHONE ENCOUNTER
Medication: Lexapro    Dose/Frequency: 10mg daily    Quantity: 30 Refills 0     Pharmacy: [facility]    Office:   [x] PCP/Provider -   [] Speciality/Provider -     Does the patient have enough for 3 days?   [x] Yes   [] No - Send as HP to POD

## 2024-10-03 ENCOUNTER — OFFICE VISIT (OUTPATIENT)
Dept: FAMILY MEDICINE CLINIC | Facility: CLINIC | Age: 24
End: 2024-10-03
Payer: COMMERCIAL

## 2024-10-03 ENCOUNTER — TELEPHONE (OUTPATIENT)
Dept: FAMILY MEDICINE CLINIC | Facility: CLINIC | Age: 24
End: 2024-10-03

## 2024-10-03 VITALS
WEIGHT: 171 LBS | HEIGHT: 68 IN | DIASTOLIC BLOOD PRESSURE: 70 MMHG | OXYGEN SATURATION: 98 % | TEMPERATURE: 98 F | SYSTOLIC BLOOD PRESSURE: 112 MMHG | HEART RATE: 68 BPM | BODY MASS INDEX: 25.91 KG/M2

## 2024-10-03 DIAGNOSIS — F41.1 GENERALIZED ANXIETY DISORDER: Primary | ICD-10-CM

## 2024-10-03 PROCEDURE — 99213 OFFICE O/P EST LOW 20 MIN: CPT | Performed by: FAMILY MEDICINE

## 2024-10-03 RX ORDER — ESCITALOPRAM OXALATE 10 MG/1
10 TABLET ORAL DAILY
Qty: 90 TABLET | Refills: 1 | Status: SHIPPED | OUTPATIENT
Start: 2024-10-03

## 2024-10-03 RX ORDER — ESCITALOPRAM OXALATE 10 MG/1
10 TABLET ORAL DAILY
Qty: 90 TABLET | Refills: 1 | Status: SHIPPED | OUTPATIENT
Start: 2024-10-03 | End: 2024-10-03 | Stop reason: SDUPTHER

## 2024-10-03 NOTE — PROGRESS NOTES
Ambulatory Visit  Name: Jeanne Scott      : 2000      MRN: [id number]  Encounter Provider: MOSHE Santos  Encounter Date: 10/3/2024   Encounter department: [facility] [address]    Assessment & Plan  Generalized anxiety disorder  Reviewed and discussed treatment, continues to find benefit with Lexapro 10 mg p.o. daily will continue, encouraged follow-up with PCP  Orders:    escitalopram (LEXAPRO) 10 mg tablet; Take 1 tablet (10 mg total) by mouth daily       History of Present Illness     F/u   Med check   Lexapro 1 0  Continues to find benefit, denies any issues in regard to panic attacks, since increase to 10 mg  Sleep  Activities  Standard presently , looking for employment  marketing   Boyfriend in dental school             Review of Systems   Constitutional:  Negative for appetite change, chills, fever and unexpected weight change.   HENT:  Negative for congestion, dental problem, ear pain, hearing loss, postnasal drip, rhinorrhea, sinus pressure, sinus pain, sneezing, sore throat, tinnitus and voice change.    Eyes:  Negative for visual disturbance.   Respiratory:  Negative for apnea, cough, chest tightness and shortness of breath.    Cardiovascular:  Negative for chest pain, palpitations and leg swelling.   Gastrointestinal:  Negative for abdominal pain, blood in stool, constipation, diarrhea, nausea and vomiting.   Endocrine: Negative for cold intolerance, heat intolerance, polydipsia, polyphagia and polyuria.   Genitourinary:  Negative for decreased urine volume, difficulty urinating, dysuria, frequency and hematuria.   Musculoskeletal:  Negative for arthralgias, back pain, gait problem, joint swelling and myalgias.   Skin:  Negative for color change, rash and wound.   Allergic/Immunologic: Negative for environmental allergies and food allergies.   Neurological:  Negative for dizziness, syncope, weakness, light-headedness, numbness and headaches.  "  Hematological:  Negative for adenopathy. Does not bruise/bleed easily.   Psychiatric/Behavioral:  Negative for sleep disturbance and suicidal ideas. The patient is not nervous/anxious.            Objective     /70   Pulse 68   Temp 98 °F (36.7 °C) (Tympanic)   Ht 5' 8\" (1.727 m)   Wt 77.6 kg (171 lb)   SpO2 98%   BMI 26.00 kg/m²     Physical Exam  Constitutional:       General: She is not in acute distress.     Appearance: She is well-developed. She is not ill-appearing or toxic-appearing.   HENT:      Head: Normocephalic and atraumatic.   Cardiovascular:      Rate and Rhythm: Normal rate.   Pulmonary:      Effort: Pulmonary effort is normal.   Musculoskeletal:         General: Normal range of motion.      Cervical back: Normal range of motion and neck supple.   Lymphadenopathy:      Cervical: No cervical adenopathy.   Skin:     General: Skin is warm and dry.   Neurological:      Mental Status: She is alert and oriented to person, place, and time.      Deep Tendon Reflexes: Reflexes are normal and symmetric.   Psychiatric:         Attention and Perception: Attention and perception normal.         Mood and Affect: Mood and affect normal.         Speech: Speech normal.         Behavior: Behavior normal.         Thought Content: Thought content normal. Thought content does not include homicidal or suicidal ideation. Thought content does not include homicidal or suicidal plan.         Judgment: Judgment normal.      Comments: Well-spoken, conversational, informed to self,         "

## 2024-10-03 NOTE — ASSESSMENT & PLAN NOTE
Reviewed and discussed treatment, continues to find benefit with Lexapro 10 mg p.o. daily will continue, encouraged follow-up with PCP  Orders:    escitalopram (LEXAPRO) 10 mg tablet; Take 1 tablet (10 mg total) by mouth daily

## 2024-12-04 ENCOUNTER — TELEPHONE (OUTPATIENT)
Age: 24
End: 2024-12-04

## 2024-12-04 DIAGNOSIS — Z13.29 SCREENING FOR THYROID DISORDER: Primary | ICD-10-CM

## 2024-12-04 NOTE — TELEPHONE ENCOUNTER
Patient is requesting labs ordered. She would like her Thyroid checked. Patient requests call to advise when ordered so she can go get them nguyễn.

## 2024-12-04 NOTE — TELEPHONE ENCOUNTER
Orders are in chart. If pt is taking biotin she is to stop for at least 2 days prior to taking test.

## 2024-12-12 ENCOUNTER — APPOINTMENT (OUTPATIENT)
Dept: LAB | Facility: HOSPITAL | Age: 24
End: 2024-12-12
Payer: COMMERCIAL

## 2024-12-12 DIAGNOSIS — Z13.29 SCREENING FOR THYROID DISORDER: ICD-10-CM

## 2024-12-12 LAB
T4 FREE SERPL-MCNC: 0.91 NG/DL (ref 0.61–1.12)
TSH SERPL DL<=0.05 MIU/L-ACNC: 2.76 UIU/ML (ref 0.45–4.5)

## 2024-12-12 PROCEDURE — 84439 ASSAY OF FREE THYROXINE: CPT

## 2024-12-12 PROCEDURE — 36415 COLL VENOUS BLD VENIPUNCTURE: CPT

## 2024-12-12 PROCEDURE — 84443 ASSAY THYROID STIM HORMONE: CPT

## 2024-12-13 ENCOUNTER — RESULTS FOLLOW-UP (OUTPATIENT)
Dept: FAMILY MEDICINE CLINIC | Facility: CLINIC | Age: 24
End: 2024-12-13

## 2025-01-31 DIAGNOSIS — Z78.9 USES ORAL CONTRACEPTIVES AS PRIMARY BIRTH CONTROL METHOD: ICD-10-CM

## 2025-01-31 RX ORDER — NORETHINDRONE ACETATE AND ETHINYL ESTRADIOL 1MG-20(21)
1 KIT ORAL DAILY
Qty: 84 TABLET | Refills: 0 | Status: SHIPPED | OUTPATIENT
Start: 2025-01-31

## 2025-03-18 DIAGNOSIS — F41.1 GENERALIZED ANXIETY DISORDER: ICD-10-CM

## 2025-03-19 RX ORDER — ESCITALOPRAM OXALATE 10 MG/1
10 TABLET ORAL DAILY
Qty: 90 TABLET | Refills: 0 | Status: SHIPPED | OUTPATIENT
Start: 2025-03-19

## 2025-04-11 NOTE — PROGRESS NOTES
Name: Jeanne Scott      : 2000      MRN: [id number]  Encounter Provider: MOSHE Abraham  Encounter Date: 2025   Encounter department: [facility] [place]    :  Assessment & Plan  Encounter for gynecological examination (general) (routine) without abnormal findings    Annual GYN examination completed today.   Health maintenance reviewed/updated as appropriate  Risk prevention and anticipatory guidance provided including:  Age related Calcium and vitamin D intake  Encouraged Breast self exams and to call with changes.  Dietary and lifestyle recommendations based on her age and weight. body mass index is 25.85 kg/m²..    Tobacco and alcohol use, intervention ordered if applicable.   Condom use for prevention of STI's. declined STI Screening.       Screening for cervical cancer    Orders:    Liquid-based pap, screening    Surveillance for birth control, oral contraceptives  Denies adverse effects on OCP. Denies ACHES.  Period extremely light this last cycle. Likely side effect of lower dose OCP. Continue to monitor- can always increase dose of estrogen if bothered by no or light menses.   Orders:    norethindrone-ethinyl estradiol (Debbi FE ) 1-20 MG-MCG per tablet; Take 1 tablet by mouth daily    Vaginal odor  Pt c/o intermittent odor. + d/c on exam thinner mucousy, wet mount with abn pH and + clue cells. Will treat for mild BV with metrogel  Orders:    POCT wet mount    metroNIDAZOLE (METROGEL) 0.75 % vaginal gel; Insert 1 Application into the vagina daily at bedtime for 5 days               History of Present Illness     Jeanne Scott is a 24 y.o. female.She is here for Gynecologic Exam (Pap 22 neg /Birth control debbi /Std testing/Gardasil )      Patient denies menstrual complaints. Regular monthly menses, not excessive  for the first time she missed a period this past month- just had a day of spotting. Not presently sexually active.   Is  on Loestrin.  Denies any wt changes  She denies any C/O abnormal vaginal discharge or irritation, but is noting  Denies Urinary complaints or breast changes    Sexually active: yes- but no partner for many months  Contraception: OCP Amanda 1/20 -denies ACHES    She reports she feels safe at home.   Lifestyle/exercise: does pilates multiple times per week  Calcium/vit D  intake: adequate      HEALTH MAINTENANCE SCREENINGS:     Previous pap smears and ASCCP screening guidelines have been reviewed.   Last Pap:  11/04/2022; Next due 2025- will collect today   History of abnormal pap: No      IMMUNIZATIONS  Gardasil HPV vaccine- is not sure if she had or not    Hereditary Cancer Screening  Cancer-related family history is negative for Breast cancer, Colon cancer, Ovarian cancer, Uterine cancer, and Cervical cancer.    Substance Abuse Screening Completed. See hx and flowsheet.    Review of Systems   Constitutional:  Negative for appetite change, chills, fatigue, fever and unexpected weight change.   HENT: Negative.     Eyes: Negative.    Respiratory:  Negative for chest tightness and shortness of breath.    Cardiovascular:  Negative for chest pain and palpitations.   Gastrointestinal:  Negative for abdominal pain, constipation and vomiting.   Endocrine: Negative for cold intolerance and heat intolerance.   Genitourinary:         As per HPI   Musculoskeletal:  Negative for back pain, joint swelling and neck pain.   Skin:  Negative for color change and rash.   Neurological:  Negative for dizziness, weakness and numbness.   Hematological:  Does not bruise/bleed easily.   Psychiatric/Behavioral: Negative.       The following portions of the patient's history were reviewed and updated as appropriate: allergies, current medications, past family history, past medical history, past social history, past surgical history, and problem list.      Objective:   Objective   /80 (BP Location: Left arm, Patient Position: Sitting,  "Cuff Size: Standard)   Ht 5' 8\" (1.727 m)   Wt 77.1 kg (170 lb)   LMP 03/07/2025 (Approximate)   BMI 25.85 kg/m²     Physical Exam  Constitutional:       General: She is not in acute distress.     Appearance: Normal appearance.   Genitourinary:      Vulva and rectum normal.      No lesions in the vagina.      Right Labia: No rash or lesions.     Left Labia: No lesions or rash.     No vaginal discharge (small to mod mucousy off white d/c noted), erythema, tenderness or bleeding.        Right Adnexa: not tender and no mass present.     Left Adnexa: not tender and no mass present.     Cervical eversion present.      No cervical motion tenderness, discharge or friability.      Uterus is not enlarged or tender.      No urethral prolapse present.      Pelvic exam was performed with patient in the lithotomy position.   Breasts:     Breasts are symmetrical.      Right: No inverted nipple, mass, nipple discharge, skin change or tenderness.      Left: No inverted nipple, mass, nipple discharge, skin change or tenderness.   HENT:      Head: Normocephalic and atraumatic.   Cardiovascular:      Rate and Rhythm: Normal rate.      Heart sounds: No murmur heard.  Pulmonary:      Effort: Pulmonary effort is normal.      Breath sounds: Normal breath sounds.   Abdominal:      General: There is no distension.      Palpations: Abdomen is soft.      Tenderness: There is no abdominal tenderness.   Musculoskeletal:         General: Normal range of motion.      Cervical back: Normal range of motion.   Lymphadenopathy:      Cervical: No cervical adenopathy.   Neurological:      Mental Status: She is alert and oriented to person, place, and time.   Skin:     General: Skin is warm and dry.   Psychiatric:         Mood and Affect: Mood normal.         Behavior: Behavior normal.   Vitals reviewed.                      "

## 2025-04-14 ENCOUNTER — ANNUAL EXAM (OUTPATIENT)
Dept: OBGYN CLINIC | Facility: MEDICAL CENTER | Age: 25
End: 2025-04-14
Payer: COMMERCIAL

## 2025-04-14 VITALS
WEIGHT: 170 LBS | SYSTOLIC BLOOD PRESSURE: 124 MMHG | DIASTOLIC BLOOD PRESSURE: 80 MMHG | HEIGHT: 68 IN | BODY MASS INDEX: 25.76 KG/M2

## 2025-04-14 DIAGNOSIS — Z01.419 ENCOUNTER FOR GYNECOLOGICAL EXAMINATION (GENERAL) (ROUTINE) WITHOUT ABNORMAL FINDINGS: Primary | ICD-10-CM

## 2025-04-14 DIAGNOSIS — Z30.41 SURVEILLANCE FOR BIRTH CONTROL, ORAL CONTRACEPTIVES: ICD-10-CM

## 2025-04-14 DIAGNOSIS — N89.8 VAGINAL ODOR: ICD-10-CM

## 2025-04-14 DIAGNOSIS — Z12.4 SCREENING FOR CERVICAL CANCER: ICD-10-CM

## 2025-04-14 LAB
BV WHIFF TEST VAG QL: ABNORMAL
CLUE CELLS SPEC QL WET PREP: ABNORMAL
PH SMN: 5 [PH]
SL AMB POCT WET MOUNT: ABNORMAL
T VAGINALIS VAG QL WET PREP: ABNORMAL
YEAST VAG QL WET PREP: ABNORMAL

## 2025-04-14 PROCEDURE — G0145 SCR C/V CYTO,THINLAYER,RESCR: HCPCS | Performed by: CLINICAL NURSE SPECIALIST

## 2025-04-14 PROCEDURE — 87210 SMEAR WET MOUNT SALINE/INK: CPT | Performed by: CLINICAL NURSE SPECIALIST

## 2025-04-14 PROCEDURE — 99395 PREV VISIT EST AGE 18-39: CPT | Performed by: CLINICAL NURSE SPECIALIST

## 2025-04-14 RX ORDER — NORETHINDRONE ACETATE AND ETHINYL ESTRADIOL AND FERROUS FUMARATE 1MG-20(21)
1 KIT ORAL DAILY
Qty: 84 TABLET | Refills: 4 | Status: SHIPPED | OUTPATIENT
Start: 2025-04-14

## 2025-04-14 RX ORDER — METRONIDAZOLE 7.5 MG/G
1 GEL VAGINAL
Qty: 70 G | Refills: 0 | Status: SHIPPED | OUTPATIENT
Start: 2025-04-14 | End: 2025-04-19

## 2025-04-14 NOTE — ASSESSMENT & PLAN NOTE
Denies adverse effects on OCP. Denies ACHES.  Period extremely light this last cycle. Likely side effect of lower dose OCP. Continue to monitor- can always increase dose of estrogen if bothered by no or light menses.   Orders:    norethindrone-ethinyl estradiol (Debbi SALGADO 1/20) 1-20 MG-MCG per tablet; Take 1 tablet by mouth daily

## 2025-04-17 LAB
LAB AP GYN PRIMARY INTERPRETATION: NORMAL
Lab: NORMAL

## 2025-04-23 ENCOUNTER — RESULTS FOLLOW-UP (OUTPATIENT)
Dept: OBGYN CLINIC | Facility: MEDICAL CENTER | Age: 25
End: 2025-04-23

## 2025-05-01 DIAGNOSIS — Z30.41 SURVEILLANCE FOR BIRTH CONTROL, ORAL CONTRACEPTIVES: ICD-10-CM

## 2025-05-01 RX ORDER — NORETHINDRONE ACETATE AND ETHINYL ESTRADIOL AND FERROUS FUMARATE 1MG-20(21)
1 KIT ORAL DAILY
Qty: 84 TABLET | Refills: 3 | Status: SHIPPED | OUTPATIENT
Start: 2025-05-01

## 2025-05-12 NOTE — PROGRESS NOTES
Name: Jeanne Scott      : 2000      MRN: [id number]  Encounter Provider: MOSHE Abraham  Encounter Date: 2025   Encounter department: [facility] [place]    :  Assessment & Plan  Amenorrhea  Most likely  secondary to  low dose OCP (Junel fe 1/20) will increase dose. Check TSH and HCG.   Orders:  •  TSH, 3rd generation with Free T4 reflex; Future  •  hCG, quantitative; Future  •  norethindrone-ethinyl estradiol-iron ([facility] ) 1.5-30 MG-MCG tablet; Take 1 tablet by mouth daily    Surveillance for birth control, oral contraceptives  See A&P for amenorrhea        Screen for STD (sexually transmitted disease)  Requesting GC/CT for STI testing.  Orders:  •  Chlamydia/GC amplified DNA by PCR    Leukorrhea  BV last month- requesting culture to verify resolution. Denies symptoms presently       Vaginal discharge    Orders:  •  Vaginosis DNA Probe               Subjective:   History of Present Illness     Jeanne Scott is a 24 y.o. female.She is here for Follow-up (Has not had a period in two months and she is not sexually active /Denies any pelvic pain, pressure and denies any vaginal bleeding. No GI or  issues per patient statement )    At YV discussed light menses on current OCP (Junel 1/20).- never got period that month, nor this past month.   Denies any recent SA- last sex in December.  Denies abnromal vaginal d/c, pelvic pain, cramping or urinary c/o  Would like STI testing w/ GC/CT    Denies changes to hair/skin. Denies wt loss/gain.  Is working out more, but still with nml BMI 25.         Menstrual History:  Patient's last menstrual period was 2025 (approximate).          Review of Systems  The following portions of the patient's history were reviewed and updated as appropriate: allergies, current medications, past family history, past medical history, past social history, past surgical history, and problem list.          Objective:    "Objective   /78 (Patient Position: Sitting, Cuff Size: Adult)   Pulse 78   Temp 98.6 °F (37 °C) (Temporal)   Ht 5' 8\" (1.727 m)   Wt 77.1 kg (170 lb)   LMP 03/07/2025 (Approximate)   SpO2 98%   BMI 25.85 kg/m²     OBGyn Exam        "

## 2025-05-13 ENCOUNTER — OFFICE VISIT (OUTPATIENT)
Dept: OBGYN CLINIC | Facility: MEDICAL CENTER | Age: 25
End: 2025-05-13
Payer: COMMERCIAL

## 2025-05-13 VITALS
DIASTOLIC BLOOD PRESSURE: 78 MMHG | BODY MASS INDEX: 25.76 KG/M2 | SYSTOLIC BLOOD PRESSURE: 128 MMHG | WEIGHT: 170 LBS | HEIGHT: 68 IN | HEART RATE: 78 BPM | OXYGEN SATURATION: 98 % | TEMPERATURE: 98.6 F

## 2025-05-13 DIAGNOSIS — N91.2 AMENORRHEA: ICD-10-CM

## 2025-05-13 DIAGNOSIS — N89.8 VAGINAL DISCHARGE: Primary | ICD-10-CM

## 2025-05-13 DIAGNOSIS — Z11.3 SCREEN FOR STD (SEXUALLY TRANSMITTED DISEASE): ICD-10-CM

## 2025-05-13 DIAGNOSIS — Z30.41 SURVEILLANCE FOR BIRTH CONTROL, ORAL CONTRACEPTIVES: ICD-10-CM

## 2025-05-13 DIAGNOSIS — N89.8 LEUKORRHEA: ICD-10-CM

## 2025-05-13 PROCEDURE — 87591 N.GONORRHOEAE DNA AMP PROB: CPT | Performed by: CLINICAL NURSE SPECIALIST

## 2025-05-13 PROCEDURE — 99213 OFFICE O/P EST LOW 20 MIN: CPT | Performed by: CLINICAL NURSE SPECIALIST

## 2025-05-13 PROCEDURE — 87510 GARDNER VAG DNA DIR PROBE: CPT | Performed by: CLINICAL NURSE SPECIALIST

## 2025-05-13 PROCEDURE — 87660 TRICHOMONAS VAGIN DIR PROBE: CPT | Performed by: CLINICAL NURSE SPECIALIST

## 2025-05-13 PROCEDURE — 87491 CHLMYD TRACH DNA AMP PROBE: CPT | Performed by: CLINICAL NURSE SPECIALIST

## 2025-05-13 PROCEDURE — 87480 CANDIDA DNA DIR PROBE: CPT | Performed by: CLINICAL NURSE SPECIALIST

## 2025-05-13 RX ORDER — NORETHINDRONE ACETATE AND ETHINYL ESTRADIOL 1.5-30(21)
1 KIT ORAL DAILY
Qty: 84 TABLET | Refills: 3 | Status: SHIPPED | OUTPATIENT
Start: 2025-05-13

## 2025-05-14 LAB
C TRACH DNA SPEC QL NAA+PROBE: NEGATIVE
CANDIDA RRNA VAG QL PROBE: NOT DETECTED
G VAGINALIS RRNA GENITAL QL PROBE: DETECTED
N GONORRHOEA DNA SPEC QL NAA+PROBE: NEGATIVE
T VAGINALIS RRNA GENITAL QL PROBE: NOT DETECTED

## 2025-05-15 ENCOUNTER — RESULTS FOLLOW-UP (OUTPATIENT)
Dept: OBGYN CLINIC | Facility: MEDICAL CENTER | Age: 25
End: 2025-05-15

## 2025-05-15 DIAGNOSIS — B96.89 BV (BACTERIAL VAGINOSIS): Primary | ICD-10-CM

## 2025-05-15 DIAGNOSIS — N76.0 BV (BACTERIAL VAGINOSIS): Primary | ICD-10-CM

## 2025-05-15 RX ORDER — METRONIDAZOLE 7.5 MG/G
1 GEL VAGINAL 2 TIMES WEEKLY
Qty: 70 G | Refills: 2 | Status: SHIPPED | OUTPATIENT
Start: 2025-05-15 | End: 2025-07-08

## 2025-05-15 RX ORDER — METRONIDAZOLE 500 MG/1
500 TABLET ORAL 2 TIMES DAILY
Qty: 14 TABLET | Refills: 0 | Status: SHIPPED | OUTPATIENT
Start: 2025-05-15 | End: 2025-05-22

## 2025-05-15 NOTE — RESULT ENCOUNTER NOTE
Culture neg for GC/CT/trichomonas and yeast.   + again for BV.  Will do flagyl oral x 7days- followed by metrogel twice per week for 6-8 wks to suppress regrowth. Can consider a probiotic or activia yogurt daily during this time to support good bacteria in the vagina-but shouldn't spend a ton of money on it as we don't have data to recommend a specific dose or strain yet- studies ongoing. The good bacteria is lactobacillus acidophilus.

## 2025-06-19 DIAGNOSIS — F41.1 GENERALIZED ANXIETY DISORDER: ICD-10-CM

## 2025-06-19 RX ORDER — ESCITALOPRAM OXALATE 10 MG/1
10 TABLET ORAL DAILY
Qty: 90 TABLET | Refills: 1 | Status: SHIPPED | OUTPATIENT
Start: 2025-06-19

## 2025-07-10 ENCOUNTER — OFFICE VISIT (OUTPATIENT)
Dept: FAMILY MEDICINE CLINIC | Facility: CLINIC | Age: 25
End: 2025-07-10
Payer: COMMERCIAL

## 2025-07-10 VITALS
TEMPERATURE: 97.9 F | SYSTOLIC BLOOD PRESSURE: 112 MMHG | HEIGHT: 68 IN | DIASTOLIC BLOOD PRESSURE: 62 MMHG | OXYGEN SATURATION: 97 % | WEIGHT: 168 LBS | BODY MASS INDEX: 25.46 KG/M2 | HEART RATE: 73 BPM

## 2025-07-10 DIAGNOSIS — R09.81 NASAL CONGESTION: ICD-10-CM

## 2025-07-10 DIAGNOSIS — D23.62: ICD-10-CM

## 2025-07-10 DIAGNOSIS — Z00.00 ANNUAL PHYSICAL EXAM: Primary | ICD-10-CM

## 2025-07-10 LAB
SARS-COV-2 AG UPPER RESP QL IA: NEGATIVE
VALID CONTROL: NORMAL

## 2025-07-10 PROCEDURE — 99395 PREV VISIT EST AGE 18-39: CPT | Performed by: PHYSICIAN ASSISTANT

## 2025-07-10 PROCEDURE — 87811 SARS-COV-2 COVID19 W/OPTIC: CPT | Performed by: PHYSICIAN ASSISTANT

## 2025-07-10 NOTE — ASSESSMENT & PLAN NOTE
Pt is here for routine check up but has congestion with sore throat and a lump on her left forearm.   Pt is healthy female.  Return to office in one year or as needed.

## 2025-07-10 NOTE — PATIENT INSTRUCTIONS
"Patient Education     Routine physical for adults   The Basics   Written by the doctors and editors at [facility]   What is a physical? -- A physical is a routine visit, or \"check-up,\" with your doctor. You might also hear it called a \"wellness visit\" or \"preventive visit.\"  During each visit, the doctor will:   Ask about your physical and mental health   Ask about your habits, behaviors, and lifestyle   Do an exam   Give you vaccines if needed   Talk to you about any medicines you take   Give advice about your health   Answer your questions  Getting regular check-ups is an important part of taking care of your health. It can help your doctor find and treat any problems you have. But it's also important for preventing health problems.  A routine physical is different from a \"sick visit.\" A sick visit is when you see a doctor because of a health concern or problem. Since physicals are scheduled ahead of time, you can think about what you want to ask the doctor.  How often should I get a physical? -- It depends on your age and health. In general, for people age 21 years and older:   If you are younger than 50 years, you might be able to get a physical every 3 years.   If you are 50 years or older, your doctor might recommend a physical every year.  If you have an ongoing health condition, like diabetes or high blood pressure, your doctor will probably want to see you more often.  What happens during a physical? -- In general, each visit will include:   Physical exam - The doctor or nurse will check your height, weight, heart rate, and blood pressure. They will also look at your eyes and ears. They will ask about how you are feeling and whether you have any symptoms that bother you.   Medicines - It's a good idea to bring a list of all the medicines you take to each doctor visit. Your doctor will talk to you about your medicines and answer any questions. Tell them if you are having any side effects that bother you. You " "should also tell them if you are having trouble paying for any of your medicines.   Habits and behaviors - This includes:   Your diet   Your exercise habits   Whether you smoke, drink alcohol, or use drugs   Whether you are sexually active   Whether you feel safe at home  Your doctor will talk to you about things you can do to improve your health and lower your risk of health problems. They will also offer help and support. For example, if you want to quit smoking, they can give you advice and might prescribe medicines. If you want to improve your diet or get more physical activity, they can help you with this, too.   Lab tests, if needed - The tests you get will depend on your age and situation. For example, your doctor might want to check your:   Cholesterol   Blood sugar   Iron level   Vaccines - The recommended vaccines will depend on your age, health, and what vaccines you already had. Vaccines are very important because they can prevent certain serious or deadly infections.   Discussion of screening - \"Screening\" means checking for diseases or other health problems before they cause symptoms. Your doctor can recommend screening based on your age, risk, and preferences. This might include tests to check for:   Cancer, such as breast, prostate, cervical, ovarian, colorectal, prostate, lung, or skin cancer   Sexually transmitted infections, such as chlamydia and gonorrhea   Mental health conditions like depression and anxiety  Your doctor will talk to you about the different types of screening tests. They can help you decide which screenings to have. They can also explain what the results might mean.   Answering questions - The physical is a good time to ask the doctor or nurse questions about your health. If needed, they can refer you to other doctors or specialists, too.  Adults older than 65 years often need other care, too. As you get older, your doctor will talk to you about:   How to prevent falling at " home   Hearing or vision tests   Memory testing   How to take your medicines safely   Making sure that you have the help and support you need at home  All topics are updated as new evidence becomes available and our peer review process is complete.  This topic retrieved from [employer] on: May 02, 2024.  Topic 320754 Version 1.0  Release: 32.4.3 - C32.122  © 2024 UpToDate, Inc. and/or its affiliates. All rights reserved.  Consumer Information Use and Disclaimer   Disclaimer: This generalized information is a limited summary of diagnosis, treatment, and/or medication information. It is not meant to be comprehensive and should be used as a tool to help the user understand and/or assess potential diagnostic and treatment options. It does NOT include all information about conditions, treatments, medications, side effects, or risks that may apply to a specific patient. It is not intended to be medical advice or a substitute for the medical advice, diagnosis, or treatment of a health care provider based on the health care provider's examination and assessment of a patient's specific and unique circumstances. Patients must speak with a health care provider for complete information about their health, medical questions, and treatment options, including any risks or benefits regarding use of medications. This information does not endorse any treatments or medications as safe, effective, or approved for treating a specific patient. UpToDate, Inc. and its affiliates disclaim any warranty or liability relating to this information or the use thereof.The use of this information is governed by the Terms of Use, available at https://www.woltersAutoBikeuwer.com/en/know/clinical-effectiveness-terms. 2024© UpToDate, Inc. and its affiliates and/or licensors. All rights reserved.  Copyright   © 2024 UpToDate, Inc. and/or its affiliates. All rights reserved.

## 2025-07-10 NOTE — ASSESSMENT & PLAN NOTE
2 days of sinus pressure and headache with sore throat.  Pt is taking Sudafed and Ibuprofen with relief of headache.  Sore throat is still present but slightly better today.  Exam shows mild erythema of throat, normal Tm bilaterally and non tender sinuses.   Continue symptomatic treatment and follow up as needed.   Orders:  •  POCT Rapid Covid Ag    Results for orders placed or performed in visit on 07/10/25   POCT Rapid Covid Ag   Result Value Ref Range    POCT SARS-CoV-2 Ag Negative Negative    VALID CONTROL Valid

## 2025-07-10 NOTE — PROGRESS NOTES
Adult Annual Physical  Name: Jeanne Scott      : 2000      MRN: [id number]  Encounter Provider: Sapphire Holden PA-C  Encounter Date: 7/10/2025   Encounter department: [facility]  [facility]    :  Assessment & Plan  Annual physical exam  Pt is here for routine check up but has congestion with sore throat and a lump on her left forearm.   Pt is healthy female.  Return to office in one year or as needed.        Nasal congestion  2 days of sinus pressure and headache with sore throat.  Pt is taking Sudafed and Ibuprofen with relief of headache.  Sore throat is still present but slightly better today.  Exam shows mild erythema of throat, normal Tm bilaterally and non tender sinuses.   Continue symptomatic treatment and follow up as needed.   Orders:  •  POCT Rapid Covid Ag    Results for orders placed or performed in visit on 07/10/25   POCT Rapid Covid Ag   Result Value Ref Range    POCT SARS-CoV-2 Ag Negative Negative    VALID CONTROL Valid       Dermatofibroma of forearm, left  Raised hard lesion consistent with dermatofibroma on left forearm.   I offered referral to dermatologist, removal in office, or observation.   Pt will think about what she would like done.            Preventive Screenings:    - Cervical cancer screening: screening up-to-date     Immunizations:  - Immunizations due: Tdap         History of Present Illness     Adult Annual Physical:  Patient presents for annual physical.     Diet and Physical Activity:  - Diet/Nutrition: no special diet.  - Exercise: moderate cardiovascular exercise and 3-4 times a week on average.    Depression Screening:  - PHQ-2 Score: 0    General Health:  - Sleep: sleeps well.  - Hearing: normal hearing bilateral ears.  - Vision: most recent eye exam > 1 year ago, no vision problems and wears glasses.  - Dental: regular dental visits.    /GYN Health:  - Follows with GYN: yes.   - Menopause: premenopausal.   - Last  "menstrual cycle: 6/19/2025.   - History of STDs: no  - Contraception: barrier methods and oral contraceptives.      Review of Systems   Constitutional:  Negative for appetite change, chills, diaphoresis, fatigue, fever and unexpected weight change.   HENT:  Positive for congestion, sinus pressure and sore throat. Negative for dental problem, ear pain, hearing loss, mouth sores, nosebleeds, rhinorrhea, tinnitus, trouble swallowing and voice change.    Eyes:  Negative for photophobia, pain, discharge and visual disturbance.   Respiratory:  Negative for cough, chest tightness, shortness of breath and wheezing.    Cardiovascular:  Negative for chest pain and palpitations.   Gastrointestinal:  Negative for abdominal pain, blood in stool, constipation, diarrhea, nausea, rectal pain and vomiting.   Endocrine: Negative for cold intolerance, polydipsia, polyphagia and polyuria.   Genitourinary:  Negative for decreased urine volume, difficulty urinating, dysuria, enuresis, frequency, genital sores, hematuria and urgency.   Musculoskeletal:  Negative for arthralgias, back pain, gait problem, joint swelling, myalgias, neck pain and neck stiffness.   Skin:  Negative for color change and rash.        Hard lump on left arm   Allergic/Immunologic: Negative for environmental allergies, food allergies and immunocompromised state.   Neurological:  Positive for headaches. Negative for dizziness, seizures, speech difficulty and light-headedness.   Hematological:  Negative for adenopathy. Does not bruise/bleed easily.   Psychiatric/Behavioral:  Negative for behavioral problems, confusion, decreased concentration, self-injury and sleep disturbance. The patient is not nervous/anxious and is not hyperactive.          Objective   /62   Pulse 73   Temp 97.9 °F (36.6 °C)   Ht 5' 8\" (1.727 m)   Wt 76.2 kg (168 lb)   SpO2 97%   BMI 25.54 kg/m²     Physical Exam  Vitals and nursing note reviewed.   Constitutional:       Appearance: " Normal appearance. She is normal weight.   HENT:      Head: Normocephalic and atraumatic.      Right Ear: Tympanic membrane, ear canal and external ear normal.      Left Ear: Tympanic membrane, ear canal and external ear normal.      Nose: Nose normal.      Mouth/Throat:      Mouth: Mucous membranes are moist.      Pharynx: Oropharynx is clear. Posterior oropharyngeal erythema present.     Eyes:      Extraocular Movements: Extraocular movements intact.      Conjunctiva/sclera: Conjunctivae normal.     Neck:      Thyroid: No thyromegaly.      Vascular: No carotid bruit.     Cardiovascular:      Rate and Rhythm: Normal rate and regular rhythm.      Pulses: Normal pulses.      Heart sounds: Normal heart sounds.   Pulmonary:      Effort: Pulmonary effort is normal.      Breath sounds: Normal breath sounds.   Abdominal:      General: Abdomen is flat. Bowel sounds are normal.      Palpations: Abdomen is soft. There is no hepatomegaly, splenomegaly or mass.      Tenderness: There is no abdominal tenderness. There is no right CVA tenderness or left CVA tenderness.     Musculoskeletal:         General: Normal range of motion.      Cervical back: Normal range of motion and neck supple.      Right lower leg: No edema.      Left lower leg: No edema.   Lymphadenopathy:      Cervical: No cervical adenopathy.     Skin:     General: Skin is warm and dry.      Findings: Lesion present.          Neurological:      General: No focal deficit present.      Mental Status: She is alert and oriented to person, place, and time.     Psychiatric:         Mood and Affect: Mood normal.         Behavior: Behavior normal.         Thought Content: Thought content normal.         Judgment: Judgment normal.

## 2025-07-10 NOTE — ASSESSMENT & PLAN NOTE
Raised hard lesion consistent with dermatofibroma on left forearm.   I offered referral to dermatologist, removal in office, or observation.   Pt will think about what she would like done.

## 2025-07-16 DIAGNOSIS — J02.9 PHARYNGITIS, UNSPECIFIED ETIOLOGY: Primary | ICD-10-CM

## 2025-07-16 RX ORDER — AZITHROMYCIN 250 MG/1
TABLET, FILM COATED ORAL
Qty: 6 TABLET | Refills: 0 | Status: SHIPPED | OUTPATIENT
Start: 2025-07-16 | End: 2025-07-20